# Patient Record
Sex: FEMALE | Race: WHITE | Employment: FULL TIME | URBAN - METROPOLITAN AREA
[De-identification: names, ages, dates, MRNs, and addresses within clinical notes are randomized per-mention and may not be internally consistent; named-entity substitution may affect disease eponyms.]

---

## 2017-06-10 ASSESSMENT — ENCOUNTER SYMPTOMS
FATIGUE: 1
CHILLS: 0
WEIGHT LOSS: 0
TREMORS: 0
PARALYSIS: 0
DIZZINESS: 0
SEIZURES: 0
DISTURBANCES IN COORDINATION: 0
HALLUCINATIONS: 0
DEPRESSION: 0
INCREASED ENERGY: 0
NERVOUS/ANXIOUS: 1
TINGLING: 0
HEADACHES: 1
DECREASED CONCENTRATION: 1
PANIC: 0
DECREASED APPETITE: 0
POLYDIPSIA: 0
POLYPHAGIA: 0
INSOMNIA: 1
ALTERED TEMPERATURE REGULATION: 1
WEAKNESS: 0
NIGHT SWEATS: 1
SPEECH CHANGE: 0
MEMORY LOSS: 1
WEIGHT GAIN: 1
LOSS OF CONSCIOUSNESS: 0
NUMBNESS: 0
FEVER: 0

## 2017-06-23 ENCOUNTER — OFFICE VISIT (OUTPATIENT)
Dept: ENDOCRINOLOGY | Facility: CLINIC | Age: 32
End: 2017-06-23

## 2017-06-23 VITALS
DIASTOLIC BLOOD PRESSURE: 77 MMHG | BODY MASS INDEX: 27.38 KG/M2 | HEART RATE: 66 BPM | SYSTOLIC BLOOD PRESSURE: 123 MMHG | WEIGHT: 180.1 LBS

## 2017-06-23 DIAGNOSIS — E06.3 HASHIMOTO'S THYROIDITIS: Primary | ICD-10-CM

## 2017-06-23 DIAGNOSIS — E06.3 HASHIMOTO'S THYROIDITIS: ICD-10-CM

## 2017-06-23 LAB
DEPRECATED CALCIDIOL+CALCIFEROL SERPL-MC: 25 UG/L (ref 20–75)
T4 FREE SERPL-MCNC: 1.16 NG/DL (ref 0.76–1.46)
TSH SERPL DL<=0.05 MIU/L-ACNC: 3.03 MU/L (ref 0.4–4)

## 2017-06-23 NOTE — PROGRESS NOTES
Endocrinology Note         Omayra is a 32 year old female presents today for Hashimoto's thyroiditis    HPI  Omayra Johnson is a 32 years old female who is here follow up Hashimoto's thyrooditis. Last seen by me 7/2016     She was noted to have abnormal thyroid function, TSH 5.04, FT4 0.97 on 8/3020/13 during her annual gynecologic examination. She has started on thyroid medication given the plan for pregnancy at that time.    Interim history  She is currently on levothyroxine 50 mcg daily. She continues to feel tired and fatigue but otherwise feeling well. Weight is stable.  She is eating well but has not exercise regularly. She has regularly period but slightly heavier over the past past few months. She gets quite anxious easily. She feels slightly cold but no heat intolerance, no diarrhea or constipation.     Past Medical History  Hashimoto's hypothyroidism  Depression/Anxiety    Allergies  Allergies   Allergen Reactions     No Known Drug Allergies      Medications  Current Outpatient Prescriptions   Medication Sig Dispense Refill     adapalene (DIFFERIN) 0.1 % gel        levothyroxine (SYNTHROID, LEVOTHROID) 50 MCG tablet Take 1 tablet (50 mcg) by mouth daily 90 tablet 4     Family History  Mother is healthy  Colon CA in her maternal grandfather and maternal grandmother  Stroke in her maternal grandmother, paternal grandfather, and paternal grandmother.    Social History  No smoking  No EtOH  No illicit drug  Work in human resource department  , no children    ROS  Constitutional:+fatigue, weight stable  Eyes: no vision change, diplopia or red eyes   Cardiovascular: no chest pain, palpitation  Respiratory: no dyspnea, cough, shortness of breath  GI: no nausea, vomiting, diarrhea or constipation, no abdominal pain   Musculoskeletal: no legs swelling  Neuro:no numbness or tingling, no tremor, no dizziness, no headache   Endo: no temperature intolerance, no hot or cold intolerance,  Psych:  +anxiety, sleeps ok but waking up often    Physical Exam  /77  Pulse 66  Wt 81.7 kg (180 lb 1.6 oz)  BMI 27.38 kg/m2  Body mass index is 27.38 kg/(m^2).  Constitutional: no distress, comfortable, pleasant   Eyes: anicteric, normal extra-ocular movements, no lid lag or retraction  Neck: fullness in her thyroid, no thyromegaly  Cardiovascular: regular rate and rhythm, normal S1 and S2, no murmurs  Respiratory: clear to auscultation, no wheezes or crackles, normal breath sounds   Gastrointestinal: nontender, no hepatomegaly  Musculoskeletal: no edema   Skin: no jaundice   Neurological:  2+reflexes at patella, normal gait, no tremor     RESULTS  Lab from [facility]  7/8/10: TSH 4.55  4/10/12: TSH 6.69  5/2/12: TSH 5.26, FT4 1.24, TT3 3.3  11/16/12: TSH 2.38    Lab from [facility] (OB-GYN clinic)  4/10/12: TSH 6.69  8/30/13: TSH 5.04, FT4 0.97        ASSESSMENT:    Omayra Ayers is a 32 years old female who is here for follow Hashimoto's hypothyroidism     1) Hashimoto's hypothyroidism: presented with subclinical hypothyroidism. She was started on thyroid replacement given the plan for pregnancy. She is currently on levothyroxine 50 mcg. I will repeat lab again today and adjust dose accordingly.    2) Hx of depression/anxiety: was taking wellbutrin and celexa but currently not taking.     3) family planning: regular period. No pregnancy desire right now.    PLAN:   - check lab today and adjust dose accordingly    Results for OMAYRA AYERS (MRN [id number]) as of 6/30/2017 17:57   Ref. Range 6/23/2017 15:23   T4 Free Latest Ref Range: 0.76 - 1.46 ng/dL 1.16   TSH Latest Ref Range: 0.40 - 4.00 mU/L 3.03   Vitamin D Deficiency screening Latest Ref Range: 20 - 75 ug/L 25     Will slightly increase levothyroxine to 50 mcg on weekday (5 days per week) and 2 pills (100 mcg) on weekend (2 days per week). I will repeat lab in 2 months. Patient verbalized understanding.  Also recommend the patient to  take vitamin D 1000 IU particularly at winter.    Aurelio Santamaria MD  Endocrinology  [phone]

## 2017-06-23 NOTE — LETTER
6/23/2017       RE: Omayra Johnson  [address] [place] [address]     Dear Colleague,    Thank you for referring your patient, Omayra Johnson, to the [facility] ENDOCRINOLOGY at [facility]. Please see a copy of my visit note below.         Endocrinology Note         Omayra is a 32 year old female presents today for Hashimoto's thyroiditis    HPI  Omayra Johnson is a 32 years old female who is here follow up Hashimoto's thyrooditis. Last seen by me 7/2016     She was noted to have abnormal thyroid function, TSH 5.04, FT4 0.97 on 8/3020/13 during her annual gynecologic examination. She has started on thyroid medication given the plan for pregnancy at that time.    Interim history  She is currently on levothyroxine 50 mcg daily. She continues to feel tired and fatigue but otherwise feeling well. Weight is stable.  She is eating well but has not exercise regularly. She has regularly period but slightly heavier over the past past few months. She gets quite anxious easily. She feels slightly cold but no heat intolerance, no diarrhea or constipation.     Past Medical History  Hashimoto's hypothyroidism  Depression/Anxiety    Allergies  Allergies   Allergen Reactions     No Known Drug Allergies      Medications  Current Outpatient Prescriptions   Medication Sig Dispense Refill     adapalene (DIFFERIN) 0.1 % gel        levothyroxine (SYNTHROID, LEVOTHROID) 50 MCG tablet Take 1 tablet (50 mcg) by mouth daily 90 tablet 4     Family History  Mother is healthy  Colon CA in her maternal grandfather and maternal grandmother  Stroke in her maternal grandmother, paternal grandfather, and paternal grandmother.    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  Constitutional: no distress, comfortable, pleasant   Eyes: anicteric, normal extra-ocular movements, no lid lag or retraction  Neck: fullness in her thyroid, no thyromegaly  Cardiovascular: regular rate and rhythm, normal S1 and S2, no murmurs  Respiratory: clear to auscultation, no wheezes or crackles, normal breath sounds   Gastrointestinal: nontender, no hepatomegaly  Musculoskeletal: no edema   Skin: no jaundice   Neurological:  2+reflexes at patella, normal gait, no tremor     RESULTS  Lab from [facility]  7/8/10: TSH 4.55  4/10/12: TSH 6.69  5/2/12: TSH 5.26, FT4 1.24, TT3 3.3  11/16/12: TSH 2.38    Lab from [facility] (OB-GYN clinic)  4/10/12: TSH 6.69  8/30/13: TSH 5.04, FT4 0.97        ASSESSMENT:    Omayra Ayers is a 32 years old female who is here for follow Hashimoto's hypothyroidism     1) Hashimoto's hypothyroidism: presented with subclinical hypothyroidism. She was started on thyroid replacement given the plan for pregnancy. She is currently on levothyroxine 50 mcg. I will repeat lab again today and adjust dose accordingly.    2) Hx of depression/anxiety: was taking wellbutrin and celexa but currently not taking.     3) family planning: regular period. No pregnancy desire right now.    PLAN:   - check lab today and adjust dose accordingly    Results for OMAYRA AYERS (MRN [id number]) as of 6/30/2017 17:57   Ref. Range 6/23/2017 15:23   T4 Free Latest Ref Range: 0.76 - 1.46 ng/dL 1.16   TSH Latest Ref Range: 0.40 - 4.00 mU/L 3.03   Vitamin D  Deficiency screening Latest Ref Range: 20 - 75 ug/L 25     Will slightly increase levothyroxine to 50 mcg on weekday (5 days per week) and 2 pills (100 mcg) on weekend (2 days per week). I will repeat lab in 2 months. Patient verbalized understanding.  Also recommend the patient to take vitamin D 1000 IU particularly at winter.    Aurelio Santamaria MD  Endocrinology  [phone]

## 2017-06-23 NOTE — MR AVS SNAPSHOT
After Visit Summary   6/23/2017    Omayra Johnson    MRN: [id number]           Patient Information     Date Of Birth          1985        Visit Information        Provider Department      6/23/2017 2:20 PM Aurelio Santamaria MD [facility] Endocrinology        Today's Diagnoses     Hashimoto's thyroiditis    -  1       Follow-ups after your visit        Your next 10 appointments already scheduled     Jun 23, 2017  3:00 PM CDT   LAB with  LAB   [facility] ([facility])    [address] [place] [address] [phone]           Patient must bring picture ID.  Patient should be prepared to give a urine specimen  Please do not eat 10-12 hours before your appointment if you are coming in fasting for labs on lipids, cholesterol, or glucose (sugar).  Pregnant women should follow their Care Team instructions. Water with medications is okay. Do not drink coffee or other fluids.   If you have concerns about taking  your medications, please ask at office or if scheduling via Deem, send a message by clicking on Secure Messaging, Message Your Care Team.              Future tests that were ordered for you today     Open Future Orders        Priority Expected Expires Ordered    TSH Routine 6/23/2017 6/23/2018 6/23/2017    T4 free Routine 6/23/2017 6/23/2018 6/23/2017    Vitamin D Deficiency (D3 Only) Routine 6/23/2017 6/23/2018 6/23/2017            Who to contact     Please call your clinic at [phone] to:    Ask questions about your health    Make or cancel appointments    Discuss your medicines    Learn about your test results    Speak to your doctor   If you have compliments or concerns about an experience at your clinic, or if you wish to file a complaint, please contact [facility] Patient Relations at [phone] or email us at [email or web address]         Additional Information About Your Visit         Goby LLChart Information     [email or web address] gives you secure access to your electronic health record. If you see a primary care provider, you can also send messages to your care team and make appointments. If you have questions, please call your primary care clinic.  If you do not have a primary care provider, please call [phone] and they will assist you.      [email or web address] is an electronic gateway that provides easy, online access to your medical records. With [email or web address], you can request a clinic appointment, read your test results, renew a prescription or communicate with your care team.     To access your existing account, please contact your [facility] or call [phone] for assistance.        Care EveryWhere ID     This is your Care EveryWhere ID. This could be used by other organizations to access your [place] medical records  [id number]        Your Vitals Were     Pulse BMI (Body Mass Index)                66 27.38 kg/m2           Blood Pressure from Last 3 Encounters:   06/23/17 123/77   07/29/16 135/84   04/03/15 131/80    Weight from Last 3 Encounters:   06/23/17 81.7 kg (180 lb 1.6 oz)   07/29/16 77.6 kg (171 lb)   04/03/15 83.7 kg (184 lb 8 oz)               Primary Care Provider Office Phone # Fax #    Ugo Dhillon [phone]       [facility] [place] [address]  [place] [address]        Equal Access to Services     BOBBY MOROCHO AH: Hadii aad ku hadasho Soomaali, waaxda luqadaha, qaybta kaalmada adeegyada, jesus woodson la'jaspern ah. So [facility] [phone].    ATENCIÓN: Si habla español, tiene a brown disposición servicios gratuitos de asistencia lingüística. Llame al [phone].    We comply with applicable federal civil rights laws and [place] laws. We do not discriminate on the basis of race, color, national origin, age, disability sex, sexual orientation or gender identity.            Thank you!     Thank you for choosing  [facility] ENDOCRINOLOGY  for your care. Our goal is always to provide you with excellent care. Hearing back from our patients is one way we can continue to improve our services. Please take a few minutes to complete the written survey that you may receive in the mail after your visit with us. Thank you!             Your Updated Medication List - Protect others around you: Learn how to safely use, store and throw away your medicines at www.disposemymeds.org.          This list is accurate as of: 6/23/17  2:57 PM.  Always use your most recent med list.                   Brand Name Dispense Instructions for use Diagnosis    adapalene 0.1 % gel    DIFFERIN      Hashimoto's thyroiditis       levothyroxine 50 MCG tablet    SYNTHROID/LEVOTHROID    90 tablet    Take 1 tablet (50 mcg) by mouth daily    Hashimoto's thyroiditis

## 2017-08-17 ENCOUNTER — DOCUMENTATION ONLY (OUTPATIENT)
Dept: LAB | Facility: CLINIC | Age: 32
End: 2017-08-17

## 2017-08-17 DIAGNOSIS — E06.3 HASHIMOTO'S THYROIDITIS: Primary | ICD-10-CM

## 2017-08-17 NOTE — PROGRESS NOTES
Patient is scheduled for lab only on 8/24/17 for a lab recheck at [facility]. Please review and place future orders to be completed at that time.    Thank you.

## 2017-08-24 DIAGNOSIS — E06.3 HASHIMOTO'S THYROIDITIS: ICD-10-CM

## 2017-08-24 PROCEDURE — 84439 ASSAY OF FREE THYROXINE: CPT | Performed by: INTERNAL MEDICINE

## 2017-08-24 PROCEDURE — 84443 ASSAY THYROID STIM HORMONE: CPT | Performed by: INTERNAL MEDICINE

## 2017-08-24 PROCEDURE — 36415 COLL VENOUS BLD VENIPUNCTURE: CPT | Performed by: INTERNAL MEDICINE

## 2017-08-25 DIAGNOSIS — E06.3 HASHIMOTO'S THYROIDITIS: ICD-10-CM

## 2017-08-25 LAB
T4 FREE SERPL-MCNC: 1.41 NG/DL (ref 0.76–1.46)
TSH SERPL DL<=0.005 MIU/L-ACNC: 0.87 MU/L (ref 0.4–4)

## 2017-08-25 RX ORDER — LEVOTHYROXINE SODIUM 50 UG/1
TABLET ORAL
Qty: 108 TABLET | Refills: 4 | Status: SHIPPED | OUTPATIENT
Start: 2017-08-25 | End: 2018-10-10

## 2017-10-22 ENCOUNTER — HEALTH MAINTENANCE LETTER (OUTPATIENT)
Age: 32
End: 2017-10-22

## 2018-05-23 ENCOUNTER — HOSPITAL ENCOUNTER (OUTPATIENT)
Facility: CLINIC | Age: 33
Setting detail: OBSERVATION
Discharge: HOME | End: 2018-05-24
Attending: EMERGENCY MEDICINE | Admitting: INTERNAL MEDICINE
Payer: COMMERCIAL

## 2018-05-23 DIAGNOSIS — Z98.890 S/P COLONOSCOPY WITH POLYPECTOMY: ICD-10-CM

## 2018-05-23 DIAGNOSIS — K92.2 LOWER GI BLEED: ICD-10-CM

## 2018-05-23 LAB
ABO + RH BLD: NORMAL
ABO + RH BLD: NORMAL
ANION GAP SERPL CALCULATED.3IONS-SCNC: 5 MMOL/L (ref 3–14)
BLD GP AB SCN SERPL QL: NORMAL
BLOOD BANK CMNT PATIENT-IMP: NORMAL
BUN SERPL-MCNC: 15 MG/DL (ref 7–30)
CALCIUM SERPL-MCNC: 8.8 MG/DL (ref 8.5–10.1)
CHLORIDE SERPL-SCNC: 106 MMOL/L (ref 94–109)
CO2 SERPL-SCNC: 29 MMOL/L (ref 20–32)
CREAT SERPL-MCNC: 0.75 MG/DL (ref 0.52–1.04)
ERYTHROCYTE [DISTWIDTH] IN BLOOD BY AUTOMATED COUNT: 13.1 % (ref 10–15)
GFR SERPL CREATININE-BSD FRML MDRD: 88 ML/MIN/1.7M2
GLUCOSE SERPL-MCNC: 130 MG/DL (ref 70–99)
HCT VFR BLD AUTO: 35.7 % (ref 35–47)
HGB BLD-MCNC: 11.4 G/DL (ref 11.7–15.7)
HGB BLD-MCNC: 11.9 G/DL (ref 11.7–15.7)
MCH RBC QN AUTO: 29.9 PG (ref 26.5–33)
MCHC RBC AUTO-ENTMCNC: 33.3 G/DL (ref 31.5–36.5)
MCV RBC AUTO: 90 FL (ref 78–100)
PLATELET # BLD AUTO: 261 10E9/L (ref 150–450)
POTASSIUM SERPL-SCNC: 3.4 MMOL/L (ref 3.4–5.3)
RBC # BLD AUTO: 3.98 10E12/L (ref 3.8–5.2)
SODIUM SERPL-SCNC: 140 MMOL/L (ref 133–144)
SPECIMEN EXP DATE BLD: NORMAL
WBC # BLD AUTO: 10.3 10E9/L (ref 4–11)

## 2018-05-23 PROCEDURE — 86901 BLOOD TYPING SEROLOGIC RH(D): CPT | Performed by: EMERGENCY MEDICINE

## 2018-05-23 PROCEDURE — 99285 EMERGENCY DEPT VISIT HI MDM: CPT | Mod: 25

## 2018-05-23 PROCEDURE — 99220 ZZC INITIAL OBSERVATION CARE,LEVL III: CPT | Performed by: INTERNAL MEDICINE

## 2018-05-23 PROCEDURE — 86850 RBC ANTIBODY SCREEN: CPT | Performed by: EMERGENCY MEDICINE

## 2018-05-23 PROCEDURE — 86900 BLOOD TYPING SEROLOGIC ABO: CPT | Performed by: EMERGENCY MEDICINE

## 2018-05-23 PROCEDURE — 85018 HEMOGLOBIN: CPT | Performed by: EMERGENCY MEDICINE

## 2018-05-23 PROCEDURE — 25000128 H RX IP 250 OP 636: Performed by: INTERNAL MEDICINE

## 2018-05-23 PROCEDURE — 85027 COMPLETE CBC AUTOMATED: CPT | Performed by: EMERGENCY MEDICINE

## 2018-05-23 PROCEDURE — G0378 HOSPITAL OBSERVATION PER HR: HCPCS

## 2018-05-23 PROCEDURE — 80048 BASIC METABOLIC PNL TOTAL CA: CPT | Performed by: EMERGENCY MEDICINE

## 2018-05-23 PROCEDURE — 36415 COLL VENOUS BLD VENIPUNCTURE: CPT

## 2018-05-23 RX ORDER — ONDANSETRON 2 MG/ML
4 INJECTION INTRAMUSCULAR; INTRAVENOUS EVERY 6 HOURS PRN
Status: DISCONTINUED | OUTPATIENT
Start: 2018-05-23 | End: 2018-05-24 | Stop reason: HOSPADM

## 2018-05-23 RX ORDER — ACETAMINOPHEN 325 MG/1
650 TABLET ORAL EVERY 4 HOURS PRN
Status: DISCONTINUED | OUTPATIENT
Start: 2018-05-23 | End: 2018-05-24 | Stop reason: HOSPADM

## 2018-05-23 RX ORDER — LIDOCAINE 40 MG/G
CREAM TOPICAL
Status: DISCONTINUED | OUTPATIENT
Start: 2018-05-23 | End: 2018-05-24 | Stop reason: HOSPADM

## 2018-05-23 RX ORDER — LEVOTHYROXINE SODIUM 100 UG/1
100 TABLET ORAL
Status: DISCONTINUED | OUTPATIENT
Start: 2018-05-26 | End: 2018-05-24 | Stop reason: HOSPADM

## 2018-05-23 RX ORDER — ACETAMINOPHEN 650 MG/1
650 SUPPOSITORY RECTAL EVERY 4 HOURS PRN
Status: DISCONTINUED | OUTPATIENT
Start: 2018-05-23 | End: 2018-05-24 | Stop reason: HOSPADM

## 2018-05-23 RX ORDER — POLYETHYLENE GLYCOL 3350 17 G/17G
17 POWDER, FOR SOLUTION ORAL DAILY PRN
Status: DISCONTINUED | OUTPATIENT
Start: 2018-05-23 | End: 2018-05-24 | Stop reason: HOSPADM

## 2018-05-23 RX ORDER — NALOXONE HYDROCHLORIDE 0.4 MG/ML
.1-.4 INJECTION, SOLUTION INTRAMUSCULAR; INTRAVENOUS; SUBCUTANEOUS
Status: DISCONTINUED | OUTPATIENT
Start: 2018-05-23 | End: 2018-05-24 | Stop reason: HOSPADM

## 2018-05-23 RX ORDER — ONDANSETRON 4 MG/1
4 TABLET, ORALLY DISINTEGRATING ORAL EVERY 6 HOURS PRN
Status: DISCONTINUED | OUTPATIENT
Start: 2018-05-23 | End: 2018-05-24 | Stop reason: HOSPADM

## 2018-05-23 RX ORDER — LEVOTHYROXINE SODIUM 50 UG/1
50 TABLET ORAL
Status: DISCONTINUED | OUTPATIENT
Start: 2018-05-24 | End: 2018-05-24 | Stop reason: HOSPADM

## 2018-05-23 RX ORDER — SODIUM CHLORIDE 9 MG/ML
INJECTION, SOLUTION INTRAVENOUS CONTINUOUS
Status: DISCONTINUED | OUTPATIENT
Start: 2018-05-23 | End: 2018-05-24

## 2018-05-23 RX ADMIN — SODIUM CHLORIDE 1000 ML: 9 INJECTION, SOLUTION INTRAVENOUS at 23:30

## 2018-05-23 ASSESSMENT — ENCOUNTER SYMPTOMS
BLOOD IN STOOL: 1
LIGHT-HEADEDNESS: 0
RECTAL PAIN: 0
ANAL BLEEDING: 1
ABDOMINAL PAIN: 0

## 2018-05-23 NOTE — IP AVS SNAPSHOT
MRN:[id number]                      After Visit Summary   5/23/2018    Omayra Johnson    MRN: [id number]           Thank you!     Thank you for choosing [facility] for your care. Our goal is always to provide you with excellent care. Hearing back from our patients is one way we can continue to improve our services. Please take a few minutes to complete the written survey that you may receive in the mail after you visit with us. Thank you!        Patient Information     Date Of Birth          1985        Designated Caregiver       Most Recent Value    Caregiver    Will someone help with your care after discharge? yes    Name of designated caregiver Franklin VELEZ      About your hospital stay     You were admitted on:  May 23, 2018 You last received care in the:  [place] [address] [facility]    You were discharged on:  May 24, 2018        Reason for your hospital stay       You were admitted with post polypectomy bleeding that has resolved.                  Who to Call     For medical emergencies, please call 911.  For non-urgent questions about your medical care, please call your primary care provider or clinic, [phone]  For questions related to your surgery, please call your surgery clinic        Attending Provider     Provider Specialty    Trierweiler, Chad A, MD Emergency Medicine    Ramsey Avendano MD Internal Medicine       Primary Care Provider Office Phone # Fax #    Omayra Harris [phone]       When to contact your care team       Call your primary doctor if you have any of the following: increased pain or bleeding or dizziness.                  After Care Instructions     Activity       Your activity upon discharge: activity as tolerated            Diet       Follow this diet upon discharge: Orders Placed This Encounter      Regular Diet Adult            Discharge Instructions       Recommend over-the-counter iron supplements/diet rich in  "iron.  Follow-up with [facility] as per schedule                  Follow-up Appointments     Follow-up and recommended labs and tests        Follow up with primary care provider, Omayra Hraris, within 7-14 days to evaluate after surgery and for hospital follow- up.  The following labs/tests are recommended: Hemoglobin level .                  Pending Results     No orders found for last 3 day(s).            Statement of Approval     Ordered          05/24/18 1520  I have reviewed and agree with all the recommendations and orders detailed in this document.  EFFECTIVE NOW     Approved and electronically signed by:  Sreekanth Cloon MD             Admission Information     Date & Time Provider Department Dept. Phone    5/23/2018 Ramsey Avendano MD [place] [address] [facility] [phone]      Your Vitals Were     Blood Pressure Pulse Temperature Respirations Height Weight    110/60 (BP Location: Left arm) 81 98.6  F (37  C) (Oral) 16 1.727 m (5' 8\") 81.7 kg (180 lb 3.2 oz)    Pulse Oximetry BMI (Body Mass Index)                96% 27.4 kg/m2          Traity Information     Traity gives you secure access to your electronic health record. If you see a primary care provider, you can also send messages to your care team and make appointments. If you have questions, please call your primary care clinic.  If you do not have a primary care provider, please call [phone] and they will assist you.        Care EveryWhere ID     This is your Care EveryWhere ID. This could be used by other organizations to access your [place] medical records  [id number]        Equal Access to Services     BOBBY MOROCHO : Hadii tyler garciao Somickeyali, waaxda luqadaha, qaybta kaalmada batoolyacarmina, jesus gurrola . So [facility] [phone].    ATENCIÓN: Si habla español, tiene a brown disposición servicios gratuitos de asistencia lingüística. Llame al [phone].    We comply with " applicable federal civil rights laws and [place] laws. We do not discriminate on the basis of race, color, national origin, age, disability, sex, sexual orientation, or gender identity.               Review of your medicines      CONTINUE these medicines which have NOT CHANGED        Dose / Directions    adapalene 0.1 % gel   Commonly known as:  DIFFERIN   Used for:  Hashimoto's thyroiditis        Apply topically daily   Refills:  0       levothyroxine 50 MCG tablet   Commonly known as:  SYNTHROID/LEVOTHROID   Used for:  Hashimoto's thyroiditis        50 mcg daily on weekday (5 days per week) and 2 pills (100 mcg) on weekend (2 days per week)   Quantity:  108 tablet   Refills:  4       SERTRALINE HCL PO        Dose:  50 mg   Take 50 mg by mouth every evening   Refills:  0                Protect others around you: Learn how to safely use, store and throw away your medicines at www.disposemymeds.org.             Medication List: This is a list of all your medications and when to take them. Check marks below indicate your daily home schedule. Keep this list as a reference.      Medications           Morning Afternoon Evening Bedtime As Needed    adapalene 0.1 % gel   Commonly known as:  DIFFERIN   Apply topically daily                                levothyroxine 50 MCG tablet   Commonly known as:  SYNTHROID/LEVOTHROID   50 mcg daily on weekday (5 days per week) and 2 pills (100 mcg) on weekend (2 days per week)   Last time this was given:  50 mcg on 5/24/2018  7:51 AM                                SERTRALINE HCL PO   Take 50 mg by mouth every evening

## 2018-05-23 NOTE — IP AVS SNAPSHOT
[address], [address]    [facility] [address]    Phone:  [phone]                                       After Visit Summary   5/23/2018    Omayra Johnson    MRN: [id number]           After Visit Summary Signature Page     I have received my discharge instructions, and my questions have been answered. I have discussed any challenges I see with this plan with the nurse or doctor.    ..........................................................................................................................................  Patient/Patient Representative Signature      ..........................................................................................................................................  Patient Representative Print Name and Relationship to Patient    ..................................................               ................................................  Date                                            Time    ..........................................................................................................................................  Reviewed by Signature/Title    ...................................................              ..............................................  Date                                                            Time

## 2018-05-24 ENCOUNTER — SURGERY (OUTPATIENT)
Age: 33
End: 2018-05-24

## 2018-05-24 VITALS
SYSTOLIC BLOOD PRESSURE: 110 MMHG | WEIGHT: 180.2 LBS | RESPIRATION RATE: 16 BRPM | OXYGEN SATURATION: 96 % | TEMPERATURE: 98.6 F | HEART RATE: 81 BPM | BODY MASS INDEX: 27.31 KG/M2 | DIASTOLIC BLOOD PRESSURE: 60 MMHG | HEIGHT: 68 IN

## 2018-05-24 LAB
FLEXIBLE SIGMOIDOSCOPY: NORMAL
HGB BLD-MCNC: 11.2 G/DL (ref 11.7–15.7)
HGB BLD-MCNC: 11.4 G/DL (ref 11.7–15.7)
HGB BLD-MCNC: 11.5 G/DL (ref 11.7–15.7)

## 2018-05-24 PROCEDURE — 45330 DIAGNOSTIC SIGMOIDOSCOPY: CPT | Performed by: INTERNAL MEDICINE

## 2018-05-24 PROCEDURE — G0500 MOD SEDAT ENDO SERVICE >5YRS: HCPCS | Performed by: INTERNAL MEDICINE

## 2018-05-24 PROCEDURE — 85018 HEMOGLOBIN: CPT | Performed by: INTERNAL MEDICINE

## 2018-05-24 PROCEDURE — 36415 COLL VENOUS BLD VENIPUNCTURE: CPT | Performed by: INTERNAL MEDICINE

## 2018-05-24 PROCEDURE — 25000132 ZZH RX MED GY IP 250 OP 250 PS 637: Performed by: INTERNAL MEDICINE

## 2018-05-24 PROCEDURE — 99217 ZZC OBSERVATION CARE DISCHARGE: CPT | Performed by: HOSPITALIST

## 2018-05-24 PROCEDURE — 99207 ZZC CDG-CODE CATEGORY CHANGED: CPT | Performed by: HOSPITALIST

## 2018-05-24 PROCEDURE — 25000128 H RX IP 250 OP 636: Performed by: INTERNAL MEDICINE

## 2018-05-24 PROCEDURE — G0378 HOSPITAL OBSERVATION PER HR: HCPCS

## 2018-05-24 RX ORDER — FENTANYL CITRATE 50 UG/ML
INJECTION, SOLUTION INTRAMUSCULAR; INTRAVENOUS PRN
Status: DISCONTINUED | OUTPATIENT
Start: 2018-05-24 | End: 2018-05-24 | Stop reason: HOSPADM

## 2018-05-24 RX ORDER — LIDOCAINE 40 MG/G
CREAM TOPICAL
Status: DISCONTINUED | OUTPATIENT
Start: 2018-05-24 | End: 2018-05-24 | Stop reason: HOSPADM

## 2018-05-24 RX ADMIN — MIDAZOLAM 1 MG: 1 INJECTION INTRAMUSCULAR; INTRAVENOUS at 12:51

## 2018-05-24 RX ADMIN — LEVOTHYROXINE SODIUM 50 MCG: 50 TABLET ORAL at 07:51

## 2018-05-24 RX ADMIN — SODIUM CHLORIDE: 9 INJECTION, SOLUTION INTRAVENOUS at 01:33

## 2018-05-24 RX ADMIN — FENTANYL CITRATE 100 MCG: 50 INJECTION, SOLUTION INTRAMUSCULAR; INTRAVENOUS at 12:51

## 2018-05-24 RX ADMIN — MIDAZOLAM 1 MG: 1 INJECTION INTRAMUSCULAR; INTRAVENOUS at 12:52

## 2018-05-24 NOTE — PLAN OF CARE
Problem: Patient Care Overview  Goal: Plan of Care/Patient Progress Review  Outcome: No Change  Pt arrived to floor at 2300. AOx4. VSS on RA. Abdominal discomfort/cramping, declines intervention. No c/o N/V. Clears diet. Dark/maroon loose stool x1 overnight. R PIV infusing NS@100, 1L bolus overnight. Up independently. GI consulted.  at bedside. Pt appeared to rest comfortably overnight. Plan pending. Continue to monitor.

## 2018-05-24 NOTE — PHARMACY-ADMISSION MEDICATION HISTORY
Admission medication history interview status for the 5/23/2018  admission is complete. See EPIC admission navigator for prior to admission medications     Medication history source reliability:Good    Actions taken by pharmacist (provider contacted, etc):None     Additional medication history information not noted on PTA med list :None    Medication reconciliation/reorder completed by provider prior to medication history? No    Time spent in this activity: 10 min    Prior to Admission medications    Medication Sig Last Dose Taking? Auth Provider   adapalene (DIFFERIN) 0.1 % gel Apply topically daily  5/23/2018 at Unknown time Yes Reported, Patient   levothyroxine (SYNTHROID/LEVOTHROID) 50 MCG tablet 50 mcg daily on weekday (5 days per week) and 2 pills (100 mcg) on weekend (2 days per week) 5/23/2018 at am Yes Aurelio Santamaria MD   SERTRALINE HCL PO Take 50 mg by mouth every evening 5/23/2018 at 1930 Yes Unknown, Entered By History

## 2018-05-24 NOTE — ED PROVIDER NOTES
"  History     Chief Complaint:  Rectal bleeding    HPI   Omayra Johnson is a 32 year old female who presents with rectal bleeding. The patient had a colonoscopy performed on Monday (2 days ago) in which 4 polyps were found and removed. She did not have a bowel movement yesterday, but tonight at 8:30 (one hour ago) felt like she needed to go. When patient got up to use the restroom, initially thought she was going to pass gas but instead passed a large amount of blood. She describes that it started out as a dark maroon but then turned into a bright red. This episode then prompted her to immediately come to the ED. Upon arrival, she reports that she needs to go again, but denies pain or any other symptoms. She mentioned she felt lightheaded initially after the incident, but is no longer experiencing this.    Allergies:  No Known Drug Allergies     Medications:    Synthroid    Past Medical History:    Acne  Depression  Insomnia    Past Surgical History:    The patient does not have any pertinent past surgical history.    Family History:    Cerebrovascular disease    Social History:  The patient was accompanied to the ED by her .  Smoking Status: No  Smokeless Tobacco: No  Alcohol Use: Yes  Marital Status:       Review of Systems   Gastrointestinal: Positive for anal bleeding and blood in stool. Negative for abdominal pain and rectal pain.   Neurological: Negative for light-headedness.   All other systems reviewed and are negative.    Physical Exam   Vitals:  Patient Vitals for the past 24 hrs:   BP Temp Temp src Pulse Resp SpO2 Height Weight   05/23/18 2240 (!) 115/91 - - 64 - 100 % - -   05/23/18 2125 114/61 98.4  F (36.9  C) Oral 80 16 97 % 1.727 m (5' 8\") 81.6 kg (180 lb)     Physical Exam  Eye:  Pupils are equal, round, and reactive.  Extraocular movements intact.    ENT:  No rhinorrhea.  Moist mucus membranes.  Normal tongue and tonsil.    Cardiac:  Regular rate and rhythm.  No murmurs, gallops, " or rubs.    Pulmonary:  Clear to auscultation bilaterally.  No wheezes, rales, or rhonchi.    Abdomen:  Positive bowel sounds.  Abdomen is soft and non-distended, without focal tenderness.    Musculoskeletal:  Normal movement of all extremities without evidence for deficit.    Skin:  Warm and dry without rashes.    Neurologic:  Non-focal exam without asymmetric weakness or numbness.     Psychiatric:  Normal affect with appropriate interaction with examiner.    Emergency Department Course     Laboratory:  Laboratory findings were communicated with the patient who voiced understanding of the findings.  CBC: AWNL. (WBC 10.3, HGB 11.9, )   BMP: Glucose: 130 (H), o/w WNL (Creatinine 0.75)  Hemoglobin: 11.4 (L)    Emergency Department Course:  Nursing notes and vitals reviewed.  2140 I had my initial encounter with the patient.  I performed an exam of the patient as documented above.   IV was inserted and blood was drawn for laboratory testing, results above.  2154 I spoke with Dr. Duff from [facility] regarding this patient.  2209 I spoke with Dr. Avendano regarding this patient.    2231 I discussed the treatment plan with the patient. They expressed understanding of this plan and consented to admission. I discussed the patient with Dr. Avendano, who will admit the patient to a monitored bed for further evaluation and treatment.    Impression & Plan      Medical Decision Making:  This healthy 32-year-old woman with a strong family history of colon cancer presents to us with rectal bleeding after undergoing a polypectomy ×4 with colonoscopy 2 days ago.  The patient notes she has felt well and did not realize there was a problem until she felt the need to pass gas this evening, but instead passed a large amount of blood.  She describes it initially being dark with clots and then had a second round of excessive discharge of bright red blood.  This all occurred just 1 hour prior to evaluation.  At this time, she has no  abdominal pain with normal vital signs.  A hemoglobin was found to be 11.4, though I do not have access to prior hemoglobins.  I spoke with Dr. Duff of [facility] who recommends admission to observation for serial hemoglobins and for assessment in the morning to determine if she needs a repeat colonoscopy.  With this, I spoke with Dr. Avendano of the hospitalist service will admit the patient under observation status.  Patient is comfortable with the plan for admission and questions were answered.    Diagnosis:    ICD-10-CM    1. Lower GI bleed K92.2 CBC (+ platelets, no diff)     Basic metabolic panel     ABO/Rh type and screen   2. S/P colonoscopy with polypectomy Z98.890        Disposition:   Admission    Scribe Disclosure:  I, Matt Amado, am serving as a scribe at 9:50 PM on 5/23/2018 to document services personally performed by Trierweiler, Chad A, MD, based on my observations and the provider's statements to me.  5/23/2018    EMERGENCY DEPARTMENT       Trierweiler, Chad A, MD  05/23/18 4543

## 2018-05-24 NOTE — CONSULTS
GASTROENTEROLOGY CONSULTATION     Omayra Johnson   [address]  [place] [address]   32 year old female   Admission Date/Time: 5/23/2018   Encounter Date: 5/24/2018  Primary Care Provider: Omayra Harris     Referring / Attending Physician: Salas Avendano   We were asked to see the patient in consultation by Dr. Avendano for evaluation of hematochezia.     HPI: Omayra Johnson is a 32 year old female with a past medical history significant for hypothyroidism, anxiety and depression who presented to the ED for evaluation of rectal bleeding. The patient underwent a screening colonoscopy on Monday May 21 with Dr Dane Love. The patient has a family history of colon polyps and colon cancer. 4 polyps were removed during the colonoscopy with the largest in the proximal rectum. Following the procedure the patient was doing quite well. Last night she felt that she needed to pass gas and passed a large volume of dark red and bright red blood. She began feeling light headed. She was brought to the ED by her .  In the ED she was found to have a hgb of 11.4 which is slightly lower than her usual. She was slightly hypotensive on admission but this improved with IV fluids. She was admitted for observation. Overnight she passed another bloody appearing stool.   This morning she states that she is feeling well. She denies abdominal pain, nausea or vomiting. Hgb this morning was stable at 11.4.     Past Medical History  Past Medical History:   Diagnosis Date     Acne      Depression      Insomnia        Past Surgical History  Past Surgical History:   Procedure Laterality Date     NO HISTORY OF SURGERY         Family History  Family History   Problem Relation Age of Onset     CEREBROVASCULAR DISEASE Maternal Grandmother      Cancer - colorectal Maternal Grandmother      Cancer - colorectal Maternal Grandfather      CEREBROVASCULAR DISEASE Paternal Grandmother      CEREBROVASCULAR DISEASE Paternal  "Grandfather        Social History  Social History     Social History     Marital status:      Spouse name: N/A     Number of children: N/A     Years of education: N/A     Occupational History     Not on file.     Social History Main Topics     Smoking status: Never Smoker     Smokeless tobacco: Never Used     Alcohol use Yes      Comment: 0-2 times per month     Drug use: No     Sexual activity: Yes     Partners: Male     Birth control/ protection: Pill     Other Topics Concern     Not on file     Social History Narrative    , no children    Works at [employer]       Medications  Prior to Admission medications    Medication Sig Start Date End Date Taking? Authorizing Provider   adapalene (DIFFERIN) 0.1 % gel Apply topically daily  1/5/16  Yes Reported, Patient   levothyroxine (SYNTHROID/LEVOTHROID) 50 MCG tablet 50 mcg daily on weekday (5 days per week) and 2 pills (100 mcg) on weekend (2 days per week) 8/25/17  Yes Aurelio Santamaria MD   SERTRALINE HCL PO Take 50 mg by mouth every evening   Yes Unknown, Entered By History       Allergies:  No known drug allergies    ROS: A ten point review of systems was obtained and negative other than the symptoms noted above in the HPI.     Physical Exam:   /73 (BP Location: Left arm)  Pulse 86  Temp 98.2  F (36.8  C) (Oral)  Resp 16  Ht 1.727 m (5' 8\")  Wt 81.7 kg (180 lb 3.2 oz)  SpO2 98%  BMI 27.4 kg/m2   Constitutional: healthy, alert, no acute distress  Cardiovascular: regular rate and rhythm, no murmurs,rubs or gallops  Respiratory: clear to auscultation bilaterally  Psychiatric: normal pleasant affect  Head: atraumatic, normocephalic  Neck: supple, no thyromegaly  ENT: mucous membranes are moist, no oral lesions are noted  Abdomen: soft, non-tender, non-distended, normally active bowel sound. No masses or hepatosplenomegaly is appreciated. No rebound tenderness or guarding  Neuro: Neurologically intact grossly  Skin: warm, dry, " no rashes are noted    ADDITIONAL COMMENTS:   I reviewed the patient's new clinical lab test results.   Recent Labs   Lab Test  05/24/18   0555  05/23/18 2219  05/23/18   2143  05/02/14   1543   WBC   --   10.3   --   7.0   HGB  11.4*  11.9  11.4*  13.5   MCV   --   90   --   91   PLT   --   261   --   279      Recent Labs   Lab Test  05/23/18 2219  07/08/10   1507   NA  140  138   POTASSIUM  3.4  3.9   CHLORIDE  106  107   CO2  29  23   BUN  15  6   CR  0.75  0.69   ANIONGAP  5  8   FER  8.8  9.2      No lab results found.     5/21/18 Colonoscopy ([facility])  Findings:  Polyp location: cecum.  Quantity: 1.  Size: 1 mm.  Polyp shape:  sessile.         Maneuver: polypectomy was performed with a cold biopsy forceps.       Removal:  complete.  Retrieval: complete.  Bleeding: none.  Polyp location: descending/sigmoid.  Quantity: 2.  Size:  5-10 mm.  Polyp shape: pedunculated.         Maneuver: polypectomy was performed with a  hot snare .       Removal: complete.  Retrieval: complete.  Bleeding: none.  Polyp location: rectum-proximal.  Quantity: 1.  Size: 11-15 mm.   Polyp shape: pedunculated.         Maneuver: polypectomy was performed with a hot snare  .       Removal: complete.  Retrieval: complete.  Bleeding: none.    The colonic mucosa and vascularity is otherwise unremarkable.  Anal canal:  normal   A: COLON, CECUM,  POLYP:  1. Tubular adenoma  2. No evidence of high grade dysplasia  3. Per the attached endoscopy report:    a. Polyp size: 1mm    b. Resection: Complete    c. Retrieval: Complete    B: COLON, DESCENDING/SIGMOID, POLYPS:  1. Tubular adenomas (2)  2. No evidence of high grade dysplasia  3. Per the attached endoscopy report:    a. Polyp sizes: 5 mm - 10 mm    b. Resection: Complete    c. Retrieval: Complete       C:  RECTUM, POLYP:   1. Tubular adenoma consistent with advanced adenoma   2. No high grade dysplasia or invasive malignancy   3. Per the attached endoscopy report:      a. Polyp size:  11-15mm    b. Resection: Complete    c. Retrieval: Complete      Assessment:  32 year old female presenting with BRBPR following colonoscopy with polypectomy performed three days ago. The patient had a large polyp removed from the proximal rectum which would likely be the source for bleeding. The two other larger polyps were removed in the sigmoid and descending colon. The patients hgb appears stable currently. Our options include observation for further bleeding vs repeat colonoscopy vs sigmoidoscopy since her largest polyps were distal in the colon. The patient would like to avoid another colonoscopy prep if possible    Plan:   -NPO  -Follow hgb and stool output  -Flexible sigmoidoscopy 1230 with Dr Osborn  -Tap water enema at 1130. Repeat if there is not significant output  -Further recommendations to follow    I discussed the patient's findings and plan with Dr. Sanchez Osborn who will also independently see and examine the patient.     Carlos Dodd PA-C  [facility]  Cell:  [phone] Monday through Friday 1221-3745  Office: [phone]

## 2018-05-24 NOTE — ED NOTES
"[facility]  ED Nurse Handoff Report    ED Chief complaint: Rectal Bleeding (patient had a colonoscopy on Monday, no BM until tonight.  reports a large amount of BRB.)      ED Diagnosis:   Final diagnoses:   Lower GI bleed   S/P colonoscopy with polypectomy       Code Status: Full Code    Allergies:   Allergies   Allergen Reactions     No Known Drug Allergies        Activity level - Baseline/Home:  Independent    Activity Level - Current:   Independent     Needed?: No    Isolation: No  Infection: Not Applicable    Bariatric?: No    Vital Signs:   Vitals:    05/23/18 2125   BP: 114/61   Pulse: 80   Resp: 16   Temp: 98.4  F (36.9  C)   TempSrc: Oral   SpO2: 97%   Weight: 81.6 kg (180 lb)   Height: 1.727 m (5' 8\")       Cardiac Rhythm: ,        Pain level: 0-10 Pain Scale: 0    Is this patient confused?: No   Suicidality: Screened negative    Patient Report: Initial Complaint: Rectal bleeding  Focused Assessment: Patient with recent colonoscopy study presents to ED with rectal bleeding with large bright red blood in stool tonight. Reports stool started with dark maroon color then turned bright red with some clots.   Tests Performed: labs  Abnormal Results:   Results for orders placed or performed during the hospital encounter of 05/23/18   Hemoglobin   Result Value Ref Range    Hemoglobin 11.4 (L) 11.7 - 15.7 g/dL     Treatments provided: No orders at this time.     Family Comments:  at bedside    OBS brochure/video discussed/provided to patient: Yes    ED Medications: Medications - No data to display    Drips infusing?:  No    For the majority of the shift this patient was Green.   Interventions performed were monitor.    Severe Sepsis OR Septic Shock Diagnosis Present: No      ED NURSE PHONE NUMBER: [phone]         "

## 2018-05-24 NOTE — H&P
[facility]    History and Physical  Hospitalist       Date of Admission:  5/23/2018  Date of Service (when I saw the patient): 05/23/18    Assessment & Plan   Omayra Johnson is a 32 year old female with a past medical history of hypothyroidism, anxiety and depression who presents with complaints of rectal bleeding.     Acute blood loss anemia 2/2 GI bleed / Hematochezia   Suspect she is bleeding from once of her surgical polyp removal sites. The patient reports undergoing a screening colonoscopy on Monday with [facility] due to a family history of colon cancer and had 4 polyps removed. Began to have rectal bleeding today.    - Hgb 11.4 on admission. Basleine appears to be normal at >13.    - Presently hemodynamically stable and without tachycardia.    - Will follow Hgb Q6 hours.    - IVF NS at 100 cc/hr and will bolus 1 L NS now.    - [facility] consulted.    - Admit to observation for now but if she continue to bleed then will transfer to inpatient.    - Consent for blood obtained.     Hypothyroidism   - C/w PTA Synthroid.     Anxiety/Depression   - C/w PTA Sertraline.        DVT Prophylaxis: Low Risk/Ambulatory with no VTE prophylaxis indicated  Code Status: Full Code was discussed.     Disposition:  Pending clinical course. Follow Hgb.       Ramsey Avendano       Primary Care Physician   Omayra Harris    Chief Complaint   Rectal bleeding.     History is obtained from the patient    History of Present Illness   Omayra Johnson is a 32 year old female with a past medical history of hypothyroidism, anxiety and depression who presents with complaints of rectal bleeding.  The patient reports undergoing a screening colonoscopy on Monday with [facility] due to a family history of colon cancer and had 4 polyps removed.  Pathology is pending but she did have the printed out report showing removal of one 1 mm polyp in the cecum, two 5-10 mm polyps and one 11-15 mm polyp in the rectum.  She  had been feeling well since Monday with no complaints and had been passing gas but no bowel movements.  Tonight while walking around her house she felt as if she were passing gas but ultimately discovered she was passing bright red blood as it began running down her leg.  She rushed into the bathroom and sat on the toilet passing a large amount of maroon colored/bright red blood per rectum with some associated clots.  She began to clean herself up and began to feel lightheaded.  She again felt the urge to have a bowel movement and again sat on the toilet and passed large amount of blood.  She is unclear if there is any stool present and reports she thinks this may have happened one more time for a total of 3 episodes of blood per rectum.  She was able to get into the shower and clean off realizing that she needed to present to the emergency department.  She does continues to complain of some mild lightheadedness here in the emergency room that she feels a little bit worse than on presentation.  She denies any abdominal pain, nausea, vomiting, diarrhea, fevers, chills and  has never had any bright red blood per rectum prior to today. No h/o ulcers, Asa or NSAID use.   Initial vital signs show blood pressure 114/61, pulse of 80, respiratory rate 16 with room air saturations are 97% and afebrile.  Routine lab work shows an unremarkable BMP, glucose of 130 and a CBC with a hemoglobin of 11.9.  She denies currently menstruating is unsure of her baseline hemoglobin but chart review shows a hemoglobin of greater than 13 a few years back.  Other than some routine lab work in the emergency department no specific treatment was given.  I have ordered a 1 L normal saline bolus and consented her for blood. She will be continued on IV fluids with routine hemoglobin checks and admitted to observation.      Past Medical History    I have reviewed this patient's medical history and updated it with pertinent information if needed.     - Hypothyroidism   - Anxiety    - Depression        Past Surgical History   I have reviewed this patient's surgical history and updated it with pertinent information if needed.  Past Surgical History:   Procedure Laterality Date     NO HISTORY OF SURGERY         Prior to Admission Medications   Prior to Admission Medications   Prescriptions Last Dose Informant Patient Reported? Taking?   SERTRALINE HCL PO 2018 at 1930 Self Yes Yes   Sig: Take 50 mg by mouth every evening   adapalene (DIFFERIN) 0.1 % gel 2018 at Unknown time Self Yes Yes   Sig: Apply topically daily    levothyroxine (SYNTHROID/LEVOTHROID) 50 MCG tablet 2018 at am Self No Yes   Si mcg daily on weekday (5 days per week) and 2 pills (100 mcg) on weekend (2 days per week)      Facility-Administered Medications: None     Allergies   Allergies   Allergen Reactions     No Known Drug Allergies        Social History   I have reviewed this patient's social history and updated it with pertinent information if needed. Omayra Marieharinderjustyna  reports that she has never smoked. She has never used smokeless tobacco. She reports that she drinks alcohol. She reports that she does not use illicit drugs.    Family History   I have reviewed this patient's family history and updated it with pertinent information if needed.   Family History   Problem Relation Age of Onset     CEREBROVASCULAR DISEASE Maternal Grandmother      Cancer - colorectal Maternal Grandmother      Cancer - colorectal Maternal Grandfather      CEREBROVASCULAR DISEASE Paternal Grandmother      CEREBROVASCULAR DISEASE Paternal Grandfather        Review of Systems   The 10 point Review of Systems is negative other than noted in the HPI or here.     Physical Exam   Temp: 98.4  F (36.9  C) Temp src: Oral BP: 114/61 Pulse: 80   Resp: 16 SpO2: 97 % O2 Device: None (Room air)    Vital Signs with Ranges  Temp:  [98.4  F (36.9  C)] 98.4  F (36.9  C)  Pulse:  [80] 80  Resp:  [16] 16  BP:  (114)/(61) 114/61  SpO2:  [97 %] 97 %  180 lbs 0 oz    Constitutional: Young white female. Awake, alert, cooperative, no apparent distress.   Eyes: Conjunctiva and pupils examined and normal.  HEENT: Moist mucous membranes, normal dentition.   Respiratory: Clear to auscultation bilaterally, no crackles or wheezing.  Cardiovascular: Regular rate and rhythm, normal S1 and S2, and no murmur noted.  GI: Soft, non-distended, non-tender, normal bowel sounds.  Lymph/Hematologic: No anterior cervical or supraclavicular adenopathy.  Skin: No rashes, no cyanosis, no edema.  Musculoskeletal: No joint swelling, erythema or tenderness.  Neurologic: Cranial nerves 2-12 intact, normal strength and sensation. No focal deficits.  Psychiatric: Alert, oriented to person, place and time, no obvious anxiety or depression.    Data   Data reviewed today:  I personally reviewed no images or EKG's today.    Recent Labs  Lab 05/23/18  2143   HGB 11.4*       No results found for this or any previous visit (from the past 24 hour(s)).

## 2018-05-24 NOTE — DISCHARGE SUMMARY
Discharge Summary  Hospitalist    Date of Admission:  5/23/2018  Date of Discharge:  5/24/2018  Discharging Provider: Sreekanth Colon MD    Primary Care Physician   Omayra Harris  Primary Care Provider Phone Number: [phone]  Primary Care Provider Fax Number: [phone]    PRINCIPAL DIAGNOSIS  Post polypectomy bleeding resolved  symptomatic acute on chronic anemia from blood loss  Status post screening colonoscopy 5/21    Past Medical History:   Diagnosis Date     Acne      Depression      Insomnia        History of Present Illness   Omayra Johnson is an 32 year old female who presented with blood in the stools    Hospital Course   Omayra Johnson is an 32 year old female with history of hypothyroidism, anxiety, depression presented to the hospital with rectal bleeding. Patient had undergone screening colonoscopy on 5/21/2018 with Fern MURPHY due to family history of colon cancer and had four polyps removed, pathology pending. Has been passing gas post procedure, last night had bright red blood running down her leg. Complained of lightheadedness and admitted on 5/23.    symptomatic acute on chronic anemia from blood loss  Post polypectomy bleeding resolved  Hgb 11.4 on admission. Basleine appears to be normal at >13.   During hospitalization hemodynamically stable and without tachycardia.   Serial hemoglobin's stable greater than 11  underwent flexible sigmoidoscopy by [facility], post polypectomy bleeding that has resolved. Recommend regular diet and plan for discharge  Recommended over-the-counter iron supplements. Recheck hemoglobin in two weeks or earlier if symptomatic     Hypothyroidism  continued with PTA Synthroid.      Anxiety/Depression  continued with PTA sertraline    # Discharge Pain Plan:   - Patient currently has NO PAIN and is not being prescribed pain medications on discharge.  Sreekanth Colon MD    Pending Results   Unresulted Labs Ordered in the Past 30  Days of this Admission     No orders found for last 61 day(s).             Physical Exam   Vitals:    05/23/18 2125 05/23/18 2330   Weight: 81.6 kg (180 lb) 81.7 kg (180 lb 3.2 oz)     Vital Signs with Ranges  Temp:  [98.2  F (36.8  C)-99.1  F (37.3  C)] 99.1  F (37.3  C)  Pulse:  [64-91] 81  Heart Rate:  [73-94] 79  Resp:  [10-23] 13  BP: (107-131)/(61-91) 113/74  SpO2:  [91 %-100 %] 98 %  I/O last 3 completed shifts:  In: 1392 [I.V.:1392]  Out: 1450 [Urine:1450]  PHYSICAL EXAM  GENERAL: Patient is in no distress. Alert and oriented.  HEENT: Oropharynx pink, moist. Pupils equal  HEART: Regular rate and rhythm. S1S2. No murmurs  LUNGS: Clear to auscultation bilaterally. No expiratory wheeze.  ABDOMEN: Soft, no abdominal tenderness, bowel sounds heard   NEURO: Cranial nerves II-XII intact. Motor and sensory system in normal range  EXTREMITIES: No pedal edema. 2+ peripheral pulses.  SKIN: Warm, dry. No rash or bruising.    )Consultations This Hospital Stay   GASTROENTEROLOGY IP CONSULT    Time Spent on this Encounter   ISreekanth, personally saw the patient today and spent greater than 30 minutes discharging this patient.  More than 50% of time spent in direct patient care, care coordination, patient/caregiver counseling, and formalizing plan of care.    Discharge Orders     Reason for your hospital stay   You were admitted with post polypectomy bleeding that has resolved.     Follow-up and recommended labs and tests    Follow up with primary care provider, Omayra Harris, within 7-14 days to evaluate after surgery and for hospital follow- up.  The following labs/tests are recommended: Hemoglobin level .     Activity   Your activity upon discharge: activity as tolerated     When to contact your care team   Call your primary doctor if you have any of the following: increased pain or bleeding or dizziness.     Discharge Instructions   Recommend over-the-counter iron supplements/diet rich in iron.  Follow-up  with [facility] as per schedule     Full Code     Diet   Follow this diet upon discharge: Orders Placed This Encounter     Regular Diet Adult         Discharge Medications   Current Discharge Medication List      CONTINUE these medications which have NOT CHANGED    Details   adapalene (DIFFERIN) 0.1 % gel Apply topically daily     Associated Diagnoses: Hashimoto's thyroiditis      levothyroxine (SYNTHROID/LEVOTHROID) 50 MCG tablet 50 mcg daily on weekday (5 days per week) and 2 pills (100 mcg) on weekend (2 days per week)  Qty: 108 tablet, Refills: 4    Associated Diagnoses: Hashimoto's thyroiditis      SERTRALINE HCL PO Take 50 mg by mouth every evening           Allergies   Allergies   Allergen Reactions     No Known Drug Allergies        Discharge Disposition   Discharged to home  Condition at discharge: Good    DATA  Most Recent 3 CBC's:  Recent Labs   Lab Test  05/24/18   1205  05/24/18   0555  05/23/18 2219 05/02/14   1543   WBC   --    --   10.3   --   7.0   HGB  11.5*  11.4*  11.9   < >  13.5   MCV   --    --   90   --   91   PLT   --    --   261   --   279    < > = values in this interval not displayed.      Most Recent 3 BMP's:  Recent Labs   Lab Test  05/23/18   2219  07/08/10   1507   NA  140  138   POTASSIUM  3.4  3.9   CHLORIDE  106  107   CO2  29  23   BUN  15  6   CR  0.75  0.69   ANIONGAP  5  8   FER  8.8  9.2   GLC  130*  85

## 2018-05-24 NOTE — PROGRESS NOTES
AOx4. VSS on RA. Up independently. Denies pain or rectal bleeding. No N/V or abdominal cramping. Discharge paperwork reviewed with patient.

## 2018-05-24 NOTE — PROGRESS NOTES
RECEIVING UNIT ED HANDOFF REVIEW    ED Nurse Handoff Report was reviewed by: Graciela Hayes on May 23, 2018 at 11:01 PM

## 2018-10-10 DIAGNOSIS — E06.3 HASHIMOTO'S THYROIDITIS: Primary | ICD-10-CM

## 2018-10-10 DIAGNOSIS — E06.3 HASHIMOTO'S THYROIDITIS: ICD-10-CM

## 2018-10-10 RX ORDER — LEVOTHYROXINE SODIUM 50 UG/1
TABLET ORAL
Qty: 100 TABLET | Refills: 0 | Status: SHIPPED | OUTPATIENT
Start: 2018-10-10 | End: 2018-11-06

## 2018-10-10 NOTE — TELEPHONE ENCOUNTER
levothyroxine (SYNTHROID/LEVOTHROID) 50 MCG tablet      Last Written Prescription Date:  8-25-17  Last Fill Quantity: 108,   # refills: 4  Last Office Visit : 6-23-17  Future Office visit:  11-16-18    Routing refill request to provider for review/approval because:  Over due lab- TSH

## 2018-10-19 ENCOUNTER — TELEPHONE (OUTPATIENT)
Dept: ENDOCRINOLOGY | Facility: CLINIC | Age: 33
End: 2018-10-19

## 2018-10-19 NOTE — TELEPHONE ENCOUNTER
"I let Omayra know the plan for f/u  For future. \"I refilled it but she should also get lab for TFT with me or PCP.  Order placed.   She can f/u with PCP yearly. \"    Thanks,   Aurelio   "

## 2018-10-31 ENCOUNTER — OFFICE VISIT (OUTPATIENT)
Dept: FAMILY MEDICINE | Facility: CLINIC | Age: 33
End: 2018-10-31
Payer: COMMERCIAL

## 2018-10-31 ENCOUNTER — TRANSFERRED RECORDS (OUTPATIENT)
Dept: HEALTH INFORMATION MANAGEMENT | Facility: CLINIC | Age: 33
End: 2018-10-31

## 2018-10-31 VITALS
TEMPERATURE: 98.5 F | SYSTOLIC BLOOD PRESSURE: 130 MMHG | DIASTOLIC BLOOD PRESSURE: 79 MMHG | HEART RATE: 63 BPM | BODY MASS INDEX: 24.71 KG/M2 | OXYGEN SATURATION: 95 % | RESPIRATION RATE: 16 BRPM | HEIGHT: 68 IN | WEIGHT: 163 LBS

## 2018-10-31 DIAGNOSIS — D50.9 IRON DEFICIENCY ANEMIA, UNSPECIFIED IRON DEFICIENCY ANEMIA TYPE: Primary | ICD-10-CM

## 2018-10-31 DIAGNOSIS — E06.3 HASHIMOTO'S THYROIDITIS: ICD-10-CM

## 2018-10-31 DIAGNOSIS — F32.1 MODERATE MAJOR DEPRESSION (H): ICD-10-CM

## 2018-10-31 LAB
ERYTHROCYTE [DISTWIDTH] IN BLOOD BY AUTOMATED COUNT: 14.3 % (ref 10–15)
HCT VFR BLD AUTO: 40.8 % (ref 35–47)
HGB BLD-MCNC: 13.5 G/DL (ref 11.7–15.7)
MCH RBC QN AUTO: 30.1 PG (ref 26.5–33)
MCHC RBC AUTO-ENTMCNC: 33.1 G/DL (ref 31.5–36.5)
MCV RBC AUTO: 91 FL (ref 78–100)
PAP SMEAR - HIM PATIENT REPORTED: NEGATIVE
PLATELET # BLD AUTO: 284 10E9/L (ref 150–450)
RBC # BLD AUTO: 4.48 10E12/L (ref 3.8–5.2)
WBC # BLD AUTO: 7.2 10E9/L (ref 4–11)

## 2018-10-31 PROCEDURE — 99203 OFFICE O/P NEW LOW 30 MIN: CPT | Performed by: FAMILY MEDICINE

## 2018-10-31 PROCEDURE — 85027 COMPLETE CBC AUTOMATED: CPT | Performed by: INTERNAL MEDICINE

## 2018-10-31 PROCEDURE — 84443 ASSAY THYROID STIM HORMONE: CPT | Performed by: INTERNAL MEDICINE

## 2018-10-31 PROCEDURE — 84439 ASSAY OF FREE THYROXINE: CPT | Performed by: INTERNAL MEDICINE

## 2018-10-31 PROCEDURE — 36415 COLL VENOUS BLD VENIPUNCTURE: CPT | Performed by: INTERNAL MEDICINE

## 2018-10-31 NOTE — NURSING NOTE
"Chief Complaint   Patient presents with     Thyroid Problem     /79  Pulse 63  Temp 98.5  F (36.9  C) (Oral)  Resp 16  Ht 5' 8\" (1.727 m)  Wt 163 lb (73.9 kg)  LMP 10/11/2018  SpO2 95%  BMI 24.78 kg/m2 Estimated body mass index is 24.78 kg/(m^2) as calculated from the following:    Height as of this encounter: 5' 8\" (1.727 m).    Weight as of this encounter: 163 lb (73.9 kg).  bp completed using cuff size: regular       Health Maintenance addressed:  Pap Smear and PHQ9    Pt had PAP done on this date 03/01/2018 , with this [facility] , results were normal    Rosetta Olivier MA     "

## 2018-10-31 NOTE — PROGRESS NOTES
SUBJECTIVE:   Omayra Stewart is a 33 year old female who presents to clinic today for the following health issues:      Hypothyroidism Follow-up- she denies any diarrhea, no heat intolerance , no tremors, no palpitations , lost weight but that was intentional.      Since last visit, patient describes the following symptoms: weight loss of 25 lbs since middle of June2018 but this is intentional , she has been on a healthy diet and exercise , avoiding carbs , eating mostly lean meats , veggies , fruits, nuts etc     Anxiety is controlled on the Zoloft 50 mg a day       Amount of exercise or physical activity: 4-5 days/week for an average of 15-30 minutes    Problems taking medications regularly: No    Medication side effects: none    Diet: lower carbs and high in protein     2) she had anemia a few month ago but apparently it was after she got a colonoscopy and had four polyps removed , she had bleeding in the colon after that and hence the anemia, she is not taking her iron pills any more so she wants to check her iron levels today .        Problem list and histories reviewed & adjusted, as indicated.  Additional history: as documented    Patient Active Problem List   Diagnosis     Moderate major depression (H)     CARDIOVASCULAR SCREENING; LDL GOAL LESS THAN 160     Insomnia     Hashimoto's thyroiditis     GI bleed     Past Surgical History:   Procedure Laterality Date     NO HISTORY OF SURGERY       SIGMOIDOSCOPY FLEXIBLE N/A 5/24/2018    Procedure: SIGMOIDOSCOPY FLEXIBLE;  flexible sigmoidoscopy;  Surgeon: Sanchez Osborn DO;  Location:  GI       Social History   Substance Use Topics     Smoking status: Never Smoker     Smokeless tobacco: Never Used     Alcohol use Yes      Comment: 0-2 times per month     Family History   Problem Relation Age of Onset     Cerebrovascular Disease Maternal Grandmother      Cancer - colorectal Maternal Grandmother      Colon Cancer Maternal Grandmother      Cancer -  "colorectal Maternal Grandfather      Colon Cancer Maternal Grandfather      Cerebrovascular Disease Paternal Grandmother      Cerebrovascular Disease Paternal Grandfather          Current Outpatient Prescriptions   Medication Sig Dispense Refill     adapalene (DIFFERIN) 0.1 % gel Apply topically daily        levothyroxine (SYNTHROID/LEVOTHROID) 50 MCG tablet Take 1 tab daily on weekdays ( 5 per week) and 2 tabs ( 100 mcg) on weekends (2 day per week) 100 tablet 0     SERTRALINE HCL PO Take 50 mg by mouth every evening       Allergies   Allergen Reactions     No Known Drug Allergies      Recent Labs   Lab Test  05/23/18   2219  08/24/17   1544  06/23/17   1523   CR  0.75   --    --    GFRESTIMATED  88   --    --    GFRESTBLACK  >90   --    --    POTASSIUM  3.4   --    --    TSH   --   0.87  3.03      BP Readings from Last 3 Encounters:   10/31/18 130/79   05/24/18 110/60   06/23/17 123/77    Wt Readings from Last 3 Encounters:   10/31/18 163 lb (73.9 kg)   05/23/18 180 lb 3.2 oz (81.7 kg)   06/23/17 180 lb 1.6 oz (81.7 kg)                  Labs reviewed in EPIC    Reviewed and updated as needed this visit by clinical staff       Reviewed and updated as needed this visit by Provider         ROS:  Constitutional, HEENT, cardiovascular, pulmonary, GI, , musculoskeletal, neuro, skin, endocrine and psych systems are negative, except as otherwise noted.    OBJECTIVE:     /79  Pulse 63  Temp 98.5  F (36.9  C) (Oral)  Resp 16  Ht 5' 8\" (1.727 m)  Wt 163 lb (73.9 kg)  LMP 10/11/2018  SpO2 95%  BMI 24.78 kg/m2  Body mass index is 24.78 kg/(m^2).  GENERAL: healthy, alert and no distress  EYES: Eyes grossly normal to inspection, PERRL and conjunctivae and sclerae normal  NECK: no adenopathy, no asymmetry, masses, or scars and thyroid normal to palpation  RESP: lungs clear to auscultation - no rales, rhonchi or wheezes  CV: regular rate and rhythm, normal S1 S2, no S3 or S4, no murmur, click or rub, no peripheral " edema and peripheral pulses strong  ABDOMEN: soft, nontender, no hepatosplenomegaly, no masses and bowel sounds normal  MS: no gross musculoskeletal defects noted, no edema    Diagnostic Test Results:  Results for orders placed or performed in visit on 10/31/18 (from the past 24 hour(s))   CBC with platelets   Result Value Ref Range    WBC 7.2 4.0 - 11.0 10e9/L    RBC Count 4.48 3.8 - 5.2 10e12/L    Hemoglobin 13.5 11.7 - 15.7 g/dL    Hematocrit 40.8 35.0 - 47.0 %    MCV 91 78 - 100 fl    MCH 30.1 26.5 - 33.0 pg    MCHC 33.1 31.5 - 36.5 g/dL    RDW 14.3 10.0 - 15.0 %    Platelet Count 284 150 - 450 10e9/L       ASSESSMENT/PLAN:       1. Hashimoto's thyroiditis  Will check and then see if she would need an adjustment in the dose , currently she is taking 50 mcg daily five days a week and 100 mcg on the weekends .  - TSH  - T4 free    2. Iron deficiency anemia, unspecified iron deficiency anemia type  Checked today , she does not have anemia will need to monitor .  - CBC with platelets    RTC if no improving or worsening.  Pt is aware  and comfortable with the current plan.      Autumn Mejia MD  [facility]

## 2018-10-31 NOTE — LETTER
Patient:  Omayra Setwart  :   1985  MRN:     [id number]        Ms.Jennifer Ebony Stewart  [address]  [place] [address]        2018    Dear ,    We are writing to inform you of your test results.    Your thyroid levels are in good range. Please continue with current dose. Given stable dose, please follow up with your primary provider annually and she can refill your medication.     Resulted Orders   TSH   Result Value Ref Range    TSH 1.53 0.40 - 4.00 mU/L   T4 free   Result Value Ref Range    T4 Free 1.09 0.76 - 1.46 ng/dL     If you have any questions, please do not hesitate to call [phone] and ask for Endocrinology clinic.      After clinic hours, please contact [phone] and ask for Endocrinologist-on call    Sincerely,    Aurelio Santamaria MD  Endocrinology      [id number]  1985

## 2018-10-31 NOTE — MR AVS SNAPSHOT
After Visit Summary   10/31/2018    Omayra Stewart    MRN: [id number]           Patient Information     Date Of Birth          1985        Visit Information        Provider Department      10/31/2018 4:20 PM Autumn Mejia MD [facility]        Today's Diagnoses     Iron deficiency anemia, unspecified iron deficiency anemia type    -  1    Hashimoto's thyroiditis        Moderate major depression (H)           Follow-ups after your visit        Follow-up notes from your care team     Return in about 3 months (around 1/31/2019).      Your next 10 appointments already scheduled     Nov 16, 2018  3:50 PM CST   (Arrive by 3:35 PM)   RETURN ENDOCRINE with Aurelio Santamaria MD   [facility] ([facility])    [address] [place] [id number] [phone]              Who to contact     If you have questions or need follow up information about today's clinic visit or your schedule please contact [facility] directly at [phone].  Normal or non-critical lab and imaging results will be communicated to you by MyChart, letter or phone within 4 business days after the clinic has received the results. If you do not hear from us within 7 days, please contact the clinic through [employer] or phone. If you have a critical or abnormal lab result, we will notify you by phone as soon as possible.  Submit refill requests through [employer] or call your pharmacy and they will forward the refill request to us. Please allow 3 business days for your refill to be completed.          Additional Information About Your Visit        MyChart Information     [employer] gives you secure access to your electronic health record. If you see a primary care provider, you can also send messages to your care team and make appointments. If you have questions, please call your primary care clinic.  If you do not have a primary care provider, please  "call [phone] and they will assist you.        Care EveryWhere ID     This is your Care EveryWhere ID. This could be used by other organizations to access your [place] medical records  [id number]        Your Vitals Were     Pulse Temperature Respirations Height Last Period Pulse Oximetry    63 98.5  F (36.9  C) (Oral) 16 5' 8\" (1.727 m) 10/11/2018 95%    BMI (Body Mass Index)                   24.78 kg/m2            Blood Pressure from Last 3 Encounters:   10/31/18 130/79   05/24/18 110/60   06/23/17 123/77    Weight from Last 3 Encounters:   10/31/18 163 lb (73.9 kg)   05/23/18 180 lb 3.2 oz (81.7 kg)   06/23/17 180 lb 1.6 oz (81.7 kg)              We Performed the Following     CBC with platelets     T4 free     TSH        Primary Care Provider Office Phone # Fax #    Autumn Mejia [phone] [address] [address]        Equal Access to Services     [facility]: Hadii tyler ku hadasho Solaura, waaxda luqadaha, qaybta kaalmada won, jesus gurrola . So [facility] [phone].    ATENCIÓN: Si habla español, tiene a brown disposición servicios gratuitos de asistencia lingüística. Jamison al [phone].    We comply with applicable federal civil rights laws and [place] laws. We do not discriminate on the basis of race, color, national origin, age, disability, sex, sexual orientation, or gender identity.            Thank you!     Thank you for choosing [facility]  for your care. Our goal is always to provide you with excellent care. Hearing back from our patients is one way we can continue to improve our services. Please take a few minutes to complete the written survey that you may receive in the mail after your visit with us. Thank you!             Your Updated Medication List - Protect others around you: Learn how to safely use, store and throw away your medicines at www.disposemymeds.org.          This list is accurate as " of 10/31/18 10:19 PM.  Always use your most recent med list.                   Brand Name Dispense Instructions for use Diagnosis    adapalene 0.1 % gel    DIFFERIN     Apply topically daily    Hashimoto's thyroiditis       levothyroxine 50 MCG tablet    SYNTHROID/LEVOTHROID    100 tablet    Take 1 tab daily on weekdays ( 5 per week) and 2 tabs ( 100 mcg) on weekends (2 day per week)    Hashimoto's thyroiditis       SERTRALINE HCL PO      Take 50 mg by mouth every evening

## 2018-11-01 LAB
T4 FREE SERPL-MCNC: 1.09 NG/DL (ref 0.76–1.46)
TSH SERPL DL<=0.005 MIU/L-ACNC: 1.53 MU/L (ref 0.4–4)

## 2018-11-04 ENCOUNTER — MYC MEDICAL ADVICE (OUTPATIENT)
Dept: FAMILY MEDICINE | Facility: CLINIC | Age: 33
End: 2018-11-04

## 2018-11-04 DIAGNOSIS — E06.3 HASHIMOTO'S THYROIDITIS: ICD-10-CM

## 2018-11-06 RX ORDER — LEVOTHYROXINE SODIUM 50 UG/1
TABLET ORAL
Qty: 100 TABLET | Refills: 2 | Status: SHIPPED | OUTPATIENT
Start: 2018-11-06 | End: 2019-10-02

## 2018-11-06 NOTE — TELEPHONE ENCOUNTER
LS,   Patient wanting Levothyroxine refilled for the next year  Pended 90 day with 2 additional refills if you approve  Thanks,  Karma MO RN

## 2019-03-08 ENCOUNTER — OFFICE VISIT (OUTPATIENT)
Dept: FAMILY MEDICINE | Facility: CLINIC | Age: 34
End: 2019-03-08
Payer: COMMERCIAL

## 2019-03-08 VITALS
TEMPERATURE: 98.2 F | OXYGEN SATURATION: 97 % | BODY MASS INDEX: 24.64 KG/M2 | SYSTOLIC BLOOD PRESSURE: 112 MMHG | DIASTOLIC BLOOD PRESSURE: 80 MMHG | HEART RATE: 68 BPM | WEIGHT: 162.06 LBS

## 2019-03-08 DIAGNOSIS — C43.59 MALIGNANT MELANOMA OF SKIN OF TRUNK (H): ICD-10-CM

## 2019-03-08 DIAGNOSIS — Z01.818 PREOP GENERAL PHYSICAL EXAM: Primary | ICD-10-CM

## 2019-03-08 PROCEDURE — 99214 OFFICE O/P EST MOD 30 MIN: CPT | Performed by: FAMILY MEDICINE

## 2019-03-08 RX ORDER — SERTRALINE HYDROCHLORIDE 100 MG/1
TABLET, FILM COATED ORAL
Refills: 2 | COMMUNITY
Start: 2019-02-20

## 2019-03-08 RX ORDER — SPIRONOLACTONE 100 MG/1
150 TABLET, FILM COATED ORAL
COMMUNITY
Start: 2018-12-21

## 2019-03-08 RX ORDER — SPIRONOLACTONE 50 MG/1
TABLET, FILM COATED ORAL
COMMUNITY
Start: 2019-02-21 | End: 2019-10-02

## 2019-03-08 RX ORDER — DAPSONE 50 MG/G
GEL TOPICAL
COMMUNITY
Start: 2019-02-21

## 2019-03-08 RX ORDER — CEPHALEXIN 500 MG/1
CAPSULE ORAL
Refills: 0 | COMMUNITY
Start: 2019-03-01 | End: 2019-07-09

## 2019-03-08 RX ORDER — ERGOCALCIFEROL 1.25 MG/1
CAPSULE, LIQUID FILLED ORAL
COMMUNITY
Start: 2019-03-05 | End: 2019-07-09

## 2019-03-08 ASSESSMENT — PAIN SCALES - GENERAL: PAINLEVEL: NO PAIN (0)

## 2019-03-08 NOTE — PROGRESS NOTES
[facility]  [address] [place]  [facility] [address] [phone]  Dept: [phone]    PRE-OP EVALUATION:  Today's date: 3/8/2019    Omayra Stewart (: 1985) presents for pre-operative evaluation assessment as requested by Dr. Smith.  She requires evaluation and anesthesia risk assessment prior to undergoing surgery/procedure for treatment of Mole reomval .    Fax number for surgical facility: [facility] Fax: [phone] and other Fax:[phone]  Primary Physician: Autumn Mejia  Type of Anesthesia Anticipated: to be determined    Patient has a Health Care Directive or Living Will:  YES     Preop Questions 3/7/2019   Who is doing your surgery? Dr. Smith   What are you having done? Mole being removed due to melanoma   Date of Surgery/Procedure: 2019   Facility or Hospital where procedure/surgery will be performed: [facility]   1.  Do you have a history of Heart attack, stroke, stent, coronary bypass surgery, or other heart surgery? No   2.  Do you ever have any pain or discomfort in your chest? No   3.  Do you have a history of  Heart Failure? No   4.   Are you troubled by shortness of breath when:  walking on a level surface, or up a slight hill, or at night? No   5.  Do you currently have a cold, bronchitis or other respiratory infection? No   6.  Do you have a cough, shortness of breath, or wheezing? No   7.  Do you sometimes get pains in the calves of your legs when you walk? No   8. Do you or anyone in your family have previous history of blood clots? No   9.  Do you or does anyone in your family have a serious bleeding problem such as prolonged bleeding following surgeries or cuts? No   10. Have you ever had problems with anemia or been told to take iron pills? No   11. Have you had any abnormal blood loss such as black, tarry or bloody stools, or abnormal vaginal bleeding? YES - after polypectomy with colonoscopy  in 2018     12. Have you ever had a blood transfusion? No   13. Have you or any of your relatives ever had problems with anesthesia? YES - in relatives , mom and cousin not pt    14. Do you have sleep apnea, excessive snoring or daytime drowsiness? No   15. Do you have any prosthetic heart valves? No   16. Do you have prosthetic joints? No   17. Is there any chance that you may be pregnant? No         HPI:     HPI related to upcoming procedure:       See problem list for active medical problems.  Problems all longstanding and stable, except as noted/documented.  See ROS for pertinent symptoms related to these conditions.                                                                                                                                                          .    MEDICAL HISTORY:     Patient Active Problem List    Diagnosis Date Noted     Malignant melanoma of skin of trunk (H) 03/08/2019     Priority: Medium     GI bleed 05/23/2018     Priority: Medium     Hashimoto's thyroiditis 01/09/2014     Priority: Medium     Insomnia 08/25/2011     Priority: Medium     CARDIOVASCULAR SCREENING; LDL GOAL LESS THAN 160 10/31/2010     Priority: Medium     Moderate major depression (H) 07/08/2010     Priority: Medium      Past Medical History:   Diagnosis Date     Acne      Depression      Insomnia      Past Surgical History:   Procedure Laterality Date     NO HISTORY OF SURGERY       SIGMOIDOSCOPY FLEXIBLE N/A 5/24/2018    Procedure: SIGMOIDOSCOPY FLEXIBLE;  flexible sigmoidoscopy;  Surgeon: Sanchez Osborn DO;  Location:  GI     Current Outpatient Medications   Medication Sig Dispense Refill     adapalene (DIFFERIN) 0.1 % gel Apply topically daily        cephALEXin (KEFLEX) 500 MG capsule   0     Dapsone 5 % GEL        levothyroxine (SYNTHROID/LEVOTHROID) 50 MCG tablet Take 1 tab daily on weekdays ( 5 per week) and 2 tabs ( 100 mcg) on weekends (2 day per week) 100 tablet 2     sertraline (ZOLOFT) 100 MG  tablet TK 1 T PO QD  2     spironolactone (ALDACTONE) 100 MG tablet Take 150 mg by mouth At night       spironolactone (ALDACTONE) 50 MG tablet Takes in the AM       vitamin D2 (ERGOCALCIFEROL) 25710 units (1250 mcg) capsule        OTC products: None, except as noted above    Allergies   Allergen Reactions     No Known Drug Allergies       Latex Allergy: NO    Social History     Tobacco Use     Smoking status: Never Smoker     Smokeless tobacco: Never Used   Substance Use Topics     Alcohol use: Yes     Comment: 0-2 times per month     History   Drug Use No       REVIEW OF SYSTEMS:   Constitutional, neuro, ENT, endocrine, pulmonary, cardiac, gastrointestinal, genitourinary, musculoskeletal, integument and psychiatric systems are negative, except as otherwise noted.    EXAM:   /80   Pulse 68   Temp 98.2  F (36.8  C) (Oral)   Wt 73.5 kg (162 lb 1 oz)   LMP 02/18/2019   SpO2 97%   Breastfeeding? No   BMI 24.64 kg/m      GENERAL APPEARANCE: healthy, alert and no distress     EYES: EOMI, PERRL     HENT: ear canals and TM's normal and nose and mouth without ulcers or lesions     NECK: no adenopathy, no asymmetry, masses, or scars and thyroid normal to palpation     RESP: lungs clear to auscultation - no rales, rhonchi or wheezes     CV: regular rates and rhythm, normal S1 S2, no S3 or S4 and no murmur, click or rub     ABDOMEN:  soft, nontender, no HSM or masses and bowel sounds normal     MS: extremities normal- no gross deformities noted, no evidence of inflammation in joints, FROM in all extremities.     SKIN: no suspicious lesions or rashes     NEURO: Normal strength and tone, sensory exam grossly normal, mentation intact and speech normal     PSYCH: mentation appears normal. and affect normal/bright     LYMPHATICS: No cervical adenopathy    DIAGNOSTICS:   No labs or EKG required for low risk surgery (cataract, skin procedure, breast biopsy, etc)    Recent Labs   Lab Test 10/31/18  1640 05/24/18  1800   05/23/18  2219   HGB 13.5 11.2*   < > 11.9     --   --  261   NA  --   --   --  140   POTASSIUM  --   --   --  3.4   CR  --   --   --  0.75    < > = values in this interval not displayed.        IMPRESSION:   Reason for surgery/procedure: Melanoma, Surgical removal   Diagnosis/reason for consult: preoperative clearing     The proposed surgical procedure is considered LOW risk.    REVISED CARDIAC RISK INDEX  The patient has the following serious cardiovascular risks for perioperative complications such as (MI, PE, VFib and 3  AV Block):  No serious cardiac risks      The patient has the following additional risks for perioperative complications:  No identified additional risks      ICD-10-CM    1. Preop general physical exam Z01.818    2. Malignant melanoma of skin of trunk (H) C43.59        RECOMMENDATIONS:         --Patient is to take all scheduled medications on the day of surgery EXCEPT for modifications listed below.    APPROVAL GIVEN to proceed with proposed procedure, without further diagnostic evaluation       Signed Electronically by: Autumn Mejia MD    Copy of this evaluation report is provided to requesting physician.    [facility] Preop Guidelines    Revised Cardiac Risk Index

## 2019-03-08 NOTE — NURSING NOTE
"Chief Complaint   Patient presents with     Pre-Op Exam     /80   Pulse 68   Temp 98.2  F (36.8  C) (Oral)   Wt 73.5 kg (162 lb 1 oz)   LMP 02/18/2019   SpO2 97%   Breastfeeding? No   BMI 24.64 kg/m   Estimated body mass index is 24.64 kg/m  as calculated from the following:    Height as of 10/31/18: 1.727 m (5' 8\").    Weight as of this encounter: 73.5 kg (162 lb 1 oz).  bp completed using cuff size: regular      Health Maintenance addressed:  NONE    n/a              "

## 2019-03-12 ENCOUNTER — TELEPHONE (OUTPATIENT)
Dept: FAMILY MEDICINE | Facility: CLINIC | Age: 34
End: 2019-03-12

## 2019-03-12 NOTE — TELEPHONE ENCOUNTER
Reason for Call:  Other Fax Pre-op     Detailed comments: [facility] called to get the Pre-op from 3/8    Please Fax it to [phone]    Call taken on 3/12/2019 at 10:11 AM by Doris Lester

## 2019-03-13 ENCOUNTER — TRANSFERRED RECORDS (OUTPATIENT)
Dept: HEALTH INFORMATION MANAGEMENT | Facility: CLINIC | Age: 34
End: 2019-03-13

## 2019-03-20 ENCOUNTER — TRANSFERRED RECORDS (OUTPATIENT)
Dept: HEALTH INFORMATION MANAGEMENT | Facility: CLINIC | Age: 34
End: 2019-03-20

## 2019-04-01 ENCOUNTER — TRANSFERRED RECORDS (OUTPATIENT)
Dept: HEALTH INFORMATION MANAGEMENT | Facility: CLINIC | Age: 34
End: 2019-04-01

## 2019-04-09 ENCOUNTER — TRANSFERRED RECORDS (OUTPATIENT)
Dept: HEALTH INFORMATION MANAGEMENT | Facility: CLINIC | Age: 34
End: 2019-04-09

## 2019-04-17 ENCOUNTER — TRANSFERRED RECORDS (OUTPATIENT)
Dept: HEALTH INFORMATION MANAGEMENT | Facility: CLINIC | Age: 34
End: 2019-04-17

## 2019-04-24 ENCOUNTER — APPOINTMENT (OUTPATIENT)
Age: 34
Setting detail: DERMATOLOGY
End: 2019-04-24

## 2019-04-24 VITALS — HEIGHT: 68 IN | WEIGHT: 160 LBS

## 2019-04-24 DIAGNOSIS — Z85.820 PERSONAL HISTORY OF MALIGNANT MELANOMA OF SKIN: ICD-10-CM

## 2019-04-24 DIAGNOSIS — L70.0 ACNE VULGARIS: ICD-10-CM

## 2019-04-24 PROCEDURE — OTHER PRESCRIPTION: OTHER

## 2019-04-24 PROCEDURE — OTHER PRESCRIPTION MEDICATION MANAGEMENT: OTHER

## 2019-04-24 PROCEDURE — 99213 OFFICE O/P EST LOW 20 MIN: CPT

## 2019-04-24 PROCEDURE — OTHER COUNSELING: OTHER

## 2019-04-24 RX ORDER — SPIRONOLACTONE 100 MG/1
100 TABLET, FILM COATED ORAL BID
Qty: 60 | Refills: 3 | Status: ERX | COMMUNITY
Start: 2019-04-24

## 2019-04-24 ASSESSMENT — LOCATION SIMPLE DESCRIPTION DERM
LOCATION SIMPLE: LEFT UPPER BACK
LOCATION SIMPLE: LEFT CHEEK

## 2019-04-24 ASSESSMENT — LOCATION DETAILED DESCRIPTION DERM
LOCATION DETAILED: LEFT INFERIOR CENTRAL MALAR CHEEK
LOCATION DETAILED: LEFT MID-UPPER BACK

## 2019-04-24 ASSESSMENT — LOCATION ZONE DERM
LOCATION ZONE: FACE
LOCATION ZONE: TRUNK

## 2019-04-24 NOTE — HPI: MELANOMA F/U (HISTORY OF MALIGNANT MELANOMA)
What Is The Reason For Today's Visit?: History of Melanoma
What Stage Is The Melanoma?: Stage IIIA
Year Excised?: 2019
Additional History: She has been seen by Dr. Pelayo for a wide surgical excision as well as a lymph node bx. which was positive. She is being followed by Dr. Senior with immunotherapy q3 wks for 1 year.

## 2019-04-24 NOTE — PROCEDURE: COUNSELING
Doxycycline Counseling:  Patient counseled regarding possible photosensitivity and increased risk for sunburn.  Patient instructed to avoid sunlight, if possible.  When exposed to sunlight, patients should wear protective clothing, sunglasses, and sunscreen.  The patient was instructed to call the office immediately if the following severe adverse effects occur:  hearing changes, easy bruising/bleeding, severe headache, or vision changes.  The patient verbalized understanding of the proper use and possible adverse effects of doxycycline.  All of the patient's questions and concerns were addressed.
Birth Control Pills Pregnancy And Lactation Text: This medication should be avoided if pregnant and for the first 30 days post-partum.
Doxycycline Pregnancy And Lactation Text: This medication is Pregnancy Category D and not consider safe during pregnancy. It is also excreted in breast milk but is considered safe for shorter treatment courses.
Spironolactone Pregnancy And Lactation Text: This medication can cause feminization of the male fetus and should be avoided during pregnancy. The active metabolite is also found in breast milk.
Topical Clindamycin Counseling: Patient counseled that this medication may cause skin irritation or allergic reactions.  In the event of skin irritation, the patient was advised to reduce the amount of the drug applied or use it less frequently.   The patient verbalized understanding of the proper use and possible adverse effects of clindamycin.  All of the patient's questions and concerns were addressed.
Spironolactone Counseling: Patient advised regarding risks of diarrhea, abdominal pain, hyperkalemia, birth defects (for female patients), liver toxicity and renal toxicity. The patient may need blood work to monitor liver and kidney function and potassium levels while on therapy. The patient verbalized understanding of the proper use and possible adverse effects of spironolactone.  All of the patient's questions and concerns were addressed.
Isotretinoin Pregnancy And Lactation Text: This medication is Pregnancy Category X and is considered extremely dangerous during pregnancy. It is unknown if it is excreted in breast milk.
Tazorac Counseling:  Patient advised that medication is irritating and drying.  Patient may need to apply sparingly and wash off after an hour before eventually leaving it on overnight.  The patient verbalized understanding of the proper use and possible adverse effects of tazorac.  All of the patient's questions and concerns were addressed.
When Should The Patient Follow-Up For Their Next Full-Body Skin Exam?: 3 Months
High Dose Vitamin A Pregnancy And Lactation Text: High dose vitamin A therapy is contraindicated during pregnancy and breast feeding.
Erythromycin Pregnancy And Lactation Text: This medication is Pregnancy Category B and is considered safe during pregnancy. It is also excreted in breast milk.
Erythromycin Counseling:  I discussed with the patient the risks of erythromycin including but not limited to GI upset, allergic reaction, drug rash, diarrhea, increase in liver enzymes, and yeast infections.
Dapsone Pregnancy And Lactation Text: This medication is Pregnancy Category C and is not considered safe during pregnancy or breast feeding.
Bactrim Counseling:  I discussed with the patient the risks of sulfa antibiotics including but not limited to GI upset, allergic reaction, drug rash, diarrhea, dizziness, photosensitivity, and yeast infections.  Rarely, more serious reactions can occur including but not limited to aplastic anemia, agranulocytosis, methemoglobinemia, blood dyscrasias, liver or kidney failure, lung infiltrates or desquamative/blistering drug rashes.
Azithromycin Pregnancy And Lactation Text: This medication is considered safe during pregnancy and is also secreted in breast milk.
Tetracycline Pregnancy And Lactation Text: This medication is Pregnancy Category D and not consider safe during pregnancy. It is also excreted in breast milk.
Use Enhanced Medication Counseling?: No
Dapsone Counseling: I discussed with the patient the risks of dapsone including but not limited to hemolytic anemia, agranulocytosis, rashes, methemoglobinemia, kidney failure, peripheral neuropathy, headaches, GI upset, and liver toxicity.  Patients who start dapsone require monitoring including baseline LFTs and weekly CBCs for the first month, then every month thereafter.  The patient verbalized understanding of the proper use and possible adverse effects of dapsone.  All of the patient's questions and concerns were addressed.
High Dose Vitamin A Counseling: Side effects reviewed, pt to contact office should one occur.
Topical Clindamycin Pregnancy And Lactation Text: This medication is Pregnancy Category B and is considered safe during pregnancy. It is unknown if it is excreted in breast milk.
Detail Level: Detailed
Topical Retinoid Pregnancy And Lactation Text: This medication is Pregnancy Category C. It is unknown if this medication is excreted in breast milk.
Tazorac Pregnancy And Lactation Text: This medication is not safe during pregnancy. It is unknown if this medication is excreted in breast milk.
Tetracycline Counseling: Patient counseled regarding possible photosensitivity and increased risk for sunburn.  Patient instructed to avoid sunlight, if possible.  When exposed to sunlight, patients should wear protective clothing, sunglasses, and sunscreen.  The patient was instructed to call the office immediately if the following severe adverse effects occur:  hearing changes, easy bruising/bleeding, severe headache, or vision changes.  The patient verbalized understanding of the proper use and possible adverse effects of tetracycline.  All of the patient's questions and concerns were addressed. Patient understands to avoid pregnancy while on therapy due to potential birth defects.
Topical Sulfur Applications Pregnancy And Lactation Text: This medication is Pregnancy Category C and has an unknown safety profile during pregnancy. It is unknown if this topical medication is excreted in breast milk.
Birth Control Pills Counseling: Birth Control Pill Counseling: I discussed with the patient the potential side effects of OCPs including but not limited to increased risk of stroke, heart attack, thrombophlebitis, deep venous thrombosis, hepatic adenomas, breast changes, GI upset, headaches, and depression.  The patient verbalized understanding of the proper use and possible adverse effects of OCPs. All of the patient's questions and concerns were addressed.
Detail Level: Zone
Benzoyl Peroxide Pregnancy And Lactation Text: This medication is Pregnancy Category C. It is unknown if benzoyl peroxide is excreted in breast milk.
Azithromycin Counseling:  I discussed with the patient the risks of azithromycin including but not limited to GI upset, allergic reaction, drug rash, diarrhea, and yeast infections.
Isotretinoin Counseling: Patient should get monthly blood tests, not donate blood, not drive at night if vision affected, not share medication, and not undergo elective surgery for 6 months after tx completed. Side effects reviewed, pt to contact office should one occur.
Minocycline Counseling: Patient advised regarding possible photosensitivity and discoloration of the teeth, skin, lips, tongue and gums.  Patient instructed to avoid sunlight, if possible.  When exposed to sunlight, patients should wear protective clothing, sunglasses, and sunscreen.  The patient was instructed to call the office immediately if the following severe adverse effects occur:  hearing changes, easy bruising/bleeding, severe headache, or vision changes.  The patient verbalized understanding of the proper use and possible adverse effects of minocycline.  All of the patient's questions and concerns were addressed.
Benzoyl Peroxide Counseling: Patient counseled that medicine may cause skin irritation and bleach clothing.  In the event of skin irritation, the patient was advised to reduce the amount of the drug applied or use it less frequently.   The patient verbalized understanding of the proper use and possible adverse effects of benzoyl peroxide.  All of the patient's questions and concerns were addressed.
Topical Retinoid counseling:  Patient advised to apply a pea-sized amount only at bedtime and wait 30 minutes after washing their face before applying.  If too drying, patient may add a non-comedogenic moisturizer. The patient verbalized understanding of the proper use and possible adverse effects of retinoids.  All of the patient's questions and concerns were addressed.
Topical Sulfur Applications Counseling: Topical Sulfur Counseling: Patient counseled that this medication may cause skin irritation or allergic reactions.  In the event of skin irritation, the patient was advised to reduce the amount of the drug applied or use it less frequently.   The patient verbalized understanding of the proper use and possible adverse effects of topical sulfur application.  All of the patient's questions and concerns were addressed.
Bactrim Pregnancy And Lactation Text: This medication is Pregnancy Category D and is known to cause fetal risk.  It is also excreted in breast milk.

## 2019-04-24 NOTE — PROCEDURE: PRESCRIPTION MEDICATION MANAGEMENT
Continue Regimen: Dapsone as directed.
Plan: Increase spironolactone as it has been improving her acne but acne is still present.
Modify Regimen: Increase Spironolactone 100 mg Take  1po BID
Detail Level: Zone
Render In Strict Bullet Format?: No

## 2019-05-09 ENCOUNTER — TRANSFERRED RECORDS (OUTPATIENT)
Dept: HEALTH INFORMATION MANAGEMENT | Facility: CLINIC | Age: 34
End: 2019-05-09

## 2019-05-30 ENCOUNTER — TRANSFERRED RECORDS (OUTPATIENT)
Dept: HEALTH INFORMATION MANAGEMENT | Facility: CLINIC | Age: 34
End: 2019-05-30

## 2019-06-20 ENCOUNTER — TRANSFERRED RECORDS (OUTPATIENT)
Dept: HEALTH INFORMATION MANAGEMENT | Facility: CLINIC | Age: 34
End: 2019-06-20

## 2019-07-09 ENCOUNTER — OFFICE VISIT (OUTPATIENT)
Dept: INTERNAL MEDICINE | Facility: CLINIC | Age: 34
End: 2019-07-09
Payer: COMMERCIAL

## 2019-07-09 VITALS
SYSTOLIC BLOOD PRESSURE: 100 MMHG | RESPIRATION RATE: 16 BRPM | WEIGHT: 174.5 LBS | TEMPERATURE: 98.1 F | OXYGEN SATURATION: 99 % | BODY MASS INDEX: 26.53 KG/M2 | HEART RATE: 74 BPM | DIASTOLIC BLOOD PRESSURE: 70 MMHG

## 2019-07-09 DIAGNOSIS — L56.4 POLYMORPHIC LIGHT ERUPTION: Primary | ICD-10-CM

## 2019-07-09 PROCEDURE — 99213 OFFICE O/P EST LOW 20 MIN: CPT | Performed by: PHYSICIAN ASSISTANT

## 2019-07-09 RX ORDER — TRIAMCINOLONE ACETONIDE 1 MG/G
OINTMENT TOPICAL 2 TIMES DAILY
Qty: 80 G | Refills: 0 | Status: SHIPPED | OUTPATIENT
Start: 2019-07-09 | End: 2019-07-09

## 2019-07-09 RX ORDER — DESONIDE 0.5 MG/ML
LOTION TOPICAL 2 TIMES DAILY
Qty: 59 ML | Refills: 0 | Status: SHIPPED | OUTPATIENT
Start: 2019-07-09

## 2019-07-09 RX ORDER — TRIAMCINOLONE ACETONIDE 1 MG/G
OINTMENT TOPICAL 2 TIMES DAILY
Qty: 80 G | Refills: 0 | Status: SHIPPED | OUTPATIENT
Start: 2019-07-09

## 2019-07-09 NOTE — PROGRESS NOTES
Subjective     Omayra Stewart is a 34 year old female who presents to clinic today for the following health issues:    HPI   Rash      Duration: Sunday night around 5    Description  Location: on chest, spread to arms, legs, hands, stomach, face, and upper thighs  Itching: moderate  Burning as well     Intensity:  moderate    Accompanying signs and symptoms: red and little bumps     History (similar episodes/previous evaluation): None    Precipitating or alleviating factors:  New exposures:  None  Recent travel: no      Therapies tried and outcome: hydrocortisone cream and oatmeal bath  Benedryl, zyrtec with no relief    Patient has been on Keytruda since April. Patient was on amoxicillin which ended on the 4th of July. Patient was outside in bikini on July 4th.     Reviewed and updated as needed this visit by Provider  Meds  Problems           Objective    /70 (BP Location: Right arm, Patient Position: Chair, Cuff Size: Adult Regular)   Pulse 74   Temp 98.1  F (36.7  C) (Oral)   Resp 16   Wt 79.2 kg (174 lb 8 oz)   LMP 06/07/2019 (Approximate)   SpO2 99%   BMI 26.53 kg/m    Body mass index is 26.53 kg/m .  Physical Exam   GENERAL: healthy, alert and no distress  SKIN: maculopapular rash on chest, upper thighs and upper arms, small round papular lesions throughout trunk, arms and legs, also small erythematous maculapapular rash on both cheeks    Diagnostic Test Results:  Labs reviewed in Epic        Assessment & Plan     1. Polymorphic light eruption  - suspect PMLE from recent sun exposure, differential drug reaction   - treatment with ointments below reviewed use and risk  - use antihistamines as needed  - considered differential of drug eruption will have patient review with her oncology team as well   - desonide (DESOWEN) 0.05 % external lotion; Apply topically 2 times daily For 1 week only (face use)  Dispense: 59 mL; Refill: 0  - triamcinolone (KENALOG) 0.1 % external ointment; Apply  topically 2 times daily  Dispense: 80 g; Refill: 0       Return in about 2 days (around 7/11/2019) for oncology follow up .    Danita Pearce PA-C  [facility]

## 2019-07-11 ENCOUNTER — TRANSFERRED RECORDS (OUTPATIENT)
Dept: HEALTH INFORMATION MANAGEMENT | Facility: CLINIC | Age: 34
End: 2019-07-11

## 2019-07-18 ENCOUNTER — TRANSFERRED RECORDS (OUTPATIENT)
Dept: HEALTH INFORMATION MANAGEMENT | Facility: CLINIC | Age: 34
End: 2019-07-18

## 2019-07-23 ENCOUNTER — TRANSFERRED RECORDS (OUTPATIENT)
Dept: HEALTH INFORMATION MANAGEMENT | Facility: CLINIC | Age: 34
End: 2019-07-23

## 2019-07-29 ENCOUNTER — TRANSFERRED RECORDS (OUTPATIENT)
Dept: HEALTH INFORMATION MANAGEMENT | Facility: CLINIC | Age: 34
End: 2019-07-29

## 2019-09-05 ENCOUNTER — TRANSFERRED RECORDS (OUTPATIENT)
Dept: HEALTH INFORMATION MANAGEMENT | Facility: CLINIC | Age: 34
End: 2019-09-05

## 2019-09-26 ENCOUNTER — TRANSFERRED RECORDS (OUTPATIENT)
Dept: HEALTH INFORMATION MANAGEMENT | Facility: CLINIC | Age: 34
End: 2019-09-26

## 2019-10-02 ENCOUNTER — OFFICE VISIT (OUTPATIENT)
Dept: FAMILY MEDICINE | Facility: CLINIC | Age: 34
End: 2019-10-02
Payer: COMMERCIAL

## 2019-10-02 VITALS
BODY MASS INDEX: 26.76 KG/M2 | OXYGEN SATURATION: 100 % | DIASTOLIC BLOOD PRESSURE: 76 MMHG | WEIGHT: 176.6 LBS | RESPIRATION RATE: 16 BRPM | HEART RATE: 79 BPM | SYSTOLIC BLOOD PRESSURE: 109 MMHG | HEIGHT: 68 IN | TEMPERATURE: 98.5 F

## 2019-10-02 DIAGNOSIS — C43.59 MALIGNANT MELANOMA OF SKIN OF TRUNK (H): ICD-10-CM

## 2019-10-02 DIAGNOSIS — E06.3 HASHIMOTO'S THYROIDITIS: ICD-10-CM

## 2019-10-02 DIAGNOSIS — L70.0 ACNE VULGARIS: ICD-10-CM

## 2019-10-02 DIAGNOSIS — N20.0 KIDNEY STONE ON LEFT SIDE: Primary | ICD-10-CM

## 2019-10-02 DIAGNOSIS — F32.1 MODERATE MAJOR DEPRESSION (H): ICD-10-CM

## 2019-10-02 PROCEDURE — 99214 OFFICE O/P EST MOD 30 MIN: CPT | Performed by: FAMILY MEDICINE

## 2019-10-02 RX ORDER — SPIRONOLACTONE 50 MG/1
200 TABLET, FILM COATED ORAL DAILY
Qty: 1 TABLET | Refills: 0 | COMMUNITY
Start: 2019-10-02

## 2019-10-02 RX ORDER — LEVOTHYROXINE SODIUM 50 UG/1
100 TABLET ORAL DAILY
Qty: 100 TABLET | Refills: 2 | COMMUNITY
Start: 2019-10-02

## 2019-10-02 ASSESSMENT — MIFFLIN-ST. JEOR: SCORE: 1539.55

## 2019-10-02 NOTE — PATIENT INSTRUCTIONS
1. Kidney stone on left side, 3 mm asymptomatic   CT scan completed at oncology- no avaliable report  We discussed that if it is only 3mm as reported to her on phone by ONCOLOGIST- then its a high chance of expulsion on own with good hydration.  She does not have any renal colic- so for now it seems asymptomatic.    - UA with Microscopic reflex to Culture    2. Moderate major depression (H)  Sertraline 100 mg once daily - no changes needed     3. Malignant melanoma of skin of trunk (H)- March 2018  Under care of Dr Aman Coates

## 2019-10-02 NOTE — PROGRESS NOTES
"Subjective     Omayra Stewart is a 34 year old female who presents to clinic today for the following health issues:    HPI   URINARY TRACT SYMPTOMS      Duration: ***    Description  { SYMPTOMS FEMALE:830051}    Intensity:  {mild,moderate,severe:654126}    Accompanying signs and symptoms:  Fever/chills: { :496488}  Flank pain { :972029}  Nausea and vomiting: { :222944}  Vaginal symptoms: {VAGINAL SYMPTOMS:776556::\"none\"}  Abdominal/Pelvic Pain: { :666522}    History  History of frequent UTI's: { :571698}  History of kidney stones: { :446644}  Sexually Active: { :882075}  Possibility of pregnancy: { :227317::\"No\"}    Precipitating or alleviating factors: {NONE DEFAULTED:663398::\"None\"}    Therapies tried and outcome: {URINARY TREATMENTS FEMALE:798437::\"none\"}   Outcome: ***    {additonal problems for provider to add (Optional):264435}    {HIST REVIEW/ LINKS 2 (Optional):829603}    {Additional problems for the provider to add (optional):889393}  Reviewed and updated as needed this visit by Provider         Review of Systems   {ROS COMP (Optional):122317}      Objective    There were no vitals taken for this visit.  There is no height or weight on file to calculate BMI.  Physical Exam   {Exam List (Optional):867709}    {Diagnostic Test Results (Optional):430921::\"Diagnostic Test Results:\",\"Labs reviewed in Epic\"}        {PROVIDER CHARTING PREFERENCE:781025}      "

## 2019-10-02 NOTE — PROGRESS NOTES
Subjective     Omayra Stewart is a 34 year old female who presents to clinic today for the following health issues:    HPI     Chief Complaint   Patient presents with     Consult     Patient found out last week that she has a Kidney Stone ([facility])   here to discuss CT scan and pain  CT scan- 09/24, routine scan from oncology- no avaliable report  Was told by oncologist that she had left kidney stone measuring 3 mm non obstructing stone, on left lower lobe of the kidney.  She reports no further information offered , so has has many questions & concerns    lifts weight and had left mid upper back strain and thought it was from  Kidney stone. Pain is dull and started  Sunday night- 9/29.  intermittent pain,mild .      History of  Malignant Melanoma -with LN involvement- see [facility]    PROBLEMS TO ADD ON...    BP Readings from Last 3 Encounters:   10/02/19 109/76   07/09/19 100/70   03/08/19 112/80    Wt Readings from Last 3 Encounters:   10/02/19 80.1 kg (176 lb 9.6 oz)   07/09/19 79.2 kg (174 lb 8 oz)   03/08/19 73.5 kg (162 lb 1 oz)                    PROBLEMS TO ADD ON...  Reviewed and updated as needed this visit by Provider  Meds         Review of Systems   ROS COMP: Constitutional, HEENT, cardiovascular, pulmonary, GI, , musculoskeletal, neuro, skin, endocrine and psych systems are negative, except as otherwise noted.      Objective    /76   Pulse 79   Wt 80.1 kg (176 lb 9.6 oz)   BMI 26.85 kg/m    Body mass index is 26.85 kg/m .  Physical Exam   GENERAL: healthy, alert and no distress  NECK: no adenopathy, no asymmetry, masses, or scars and thyroid normal to palpation  RESP: lungs clear to auscultation - no rales, rhonchi or wheezes  CV: regular rate and rhythm, normal S1 S2, no S3 or S4, no murmur, click or rub, no peripheral edema and peripheral pulses strong  ABDOMEN: soft, nontender, no hepatosplenomegaly, no masses and bowel sounds normal  MS:left sided back pain from  Lifting weight  but no flank pains. no gross musculoskeletal defects noted, no edema    Diagnostic Test Results:  Labs reviewed in Epic        Assessment & Plan     1. Kidney stone on left side, 3 mm asymptomatic     We discussed that if it is only 3mm as reported to her on phone by ONCOLOGIST- then its a high chance of expulsion on own with good hydration.  She does not have any renal colic- so for now it seems asymptomatic.    - UA with Microscopic reflex to Culture in future if burning pain.advised stay well hydrated.  She has a follow up appointment with [facility] in 2 weeks- and gets periodic blood test- and she will check with them about renal functions periodically    encouraged follow up if renal colic or concerns     2. Moderate major depression ([facility])  Sertraline 100 mg once daily - no changes needed     3. Malignant melanoma of skin of trunk ([facility])- March 2018  Under care of Dr Aman Coates    She also takkes levothyroxine for thyroid- dose is update- its under care of endo  And for acne, she take spirinilactone 100 mg once daily      Return in about 4 weeks (around 10/30/2019) for concerns,unresolved.    Kristina Freitas MD  [facility]

## 2019-10-17 ENCOUNTER — TRANSFERRED RECORDS (OUTPATIENT)
Dept: HEALTH INFORMATION MANAGEMENT | Facility: CLINIC | Age: 34
End: 2019-10-17

## 2019-11-07 ENCOUNTER — TRANSFERRED RECORDS (OUTPATIENT)
Dept: HEALTH INFORMATION MANAGEMENT | Facility: CLINIC | Age: 34
End: 2019-11-07

## 2019-11-29 ENCOUNTER — TRANSFERRED RECORDS (OUTPATIENT)
Dept: HEALTH INFORMATION MANAGEMENT | Facility: CLINIC | Age: 34
End: 2019-11-29

## 2020-01-09 ENCOUNTER — TRANSFERRED RECORDS (OUTPATIENT)
Dept: HEALTH INFORMATION MANAGEMENT | Facility: CLINIC | Age: 35
End: 2020-01-09

## 2020-02-20 ENCOUNTER — TRANSFERRED RECORDS (OUTPATIENT)
Dept: HEALTH INFORMATION MANAGEMENT | Facility: CLINIC | Age: 35
End: 2020-02-20

## 2020-03-01 ENCOUNTER — HEALTH MAINTENANCE LETTER (OUTPATIENT)
Age: 35
End: 2020-03-01

## 2020-05-29 ENCOUNTER — TRANSFERRED RECORDS (OUTPATIENT)
Dept: HEALTH INFORMATION MANAGEMENT | Facility: CLINIC | Age: 35
End: 2020-05-29

## 2020-06-05 ENCOUNTER — TRANSFERRED RECORDS (OUTPATIENT)
Dept: HEALTH INFORMATION MANAGEMENT | Facility: CLINIC | Age: 35
End: 2020-06-05

## 2020-06-15 ENCOUNTER — TRANSFERRED RECORDS (OUTPATIENT)
Dept: HEALTH INFORMATION MANAGEMENT | Facility: CLINIC | Age: 35
End: 2020-06-15

## 2020-09-29 ENCOUNTER — TRANSFERRED RECORDS (OUTPATIENT)
Dept: HEALTH INFORMATION MANAGEMENT | Facility: CLINIC | Age: 35
End: 2020-09-29

## 2020-12-13 ENCOUNTER — HEALTH MAINTENANCE LETTER (OUTPATIENT)
Age: 35
End: 2020-12-13

## 2021-03-22 ENCOUNTER — TRANSFERRED RECORDS (OUTPATIENT)
Dept: HEALTH INFORMATION MANAGEMENT | Facility: CLINIC | Age: 36
End: 2021-03-22

## 2021-03-24 ENCOUNTER — IMMUNIZATION (OUTPATIENT)
Dept: NURSING | Facility: CLINIC | Age: 36
End: 2021-03-24
Payer: COMMERCIAL

## 2021-03-24 PROCEDURE — 0001A PR COVID VAC PFIZER DIL RECON 30 MCG/0.3 ML IM: CPT

## 2021-03-24 PROCEDURE — 91300 PR COVID VAC PFIZER DIL RECON 30 MCG/0.3 ML IM: CPT

## 2021-04-14 ENCOUNTER — OFFICE VISIT (OUTPATIENT)
Dept: NURSING | Facility: CLINIC | Age: 36
End: 2021-04-14
Attending: INTERNAL MEDICINE
Payer: COMMERCIAL

## 2021-04-14 PROCEDURE — 0002A PR COVID VAC PFIZER DIL RECON 30 MCG/0.3 ML IM: CPT

## 2021-04-14 PROCEDURE — 91300 PR COVID VAC PFIZER DIL RECON 30 MCG/0.3 ML IM: CPT

## 2021-04-17 ENCOUNTER — HEALTH MAINTENANCE LETTER (OUTPATIENT)
Age: 36
End: 2021-04-17

## 2021-09-10 ENCOUNTER — TRANSFERRED RECORDS (OUTPATIENT)
Dept: HEALTH INFORMATION MANAGEMENT | Facility: CLINIC | Age: 36
End: 2021-09-10
Payer: COMMERCIAL

## 2021-10-02 ENCOUNTER — HEALTH MAINTENANCE LETTER (OUTPATIENT)
Age: 36
End: 2021-10-02

## 2021-10-18 ENCOUNTER — TRANSFERRED RECORDS (OUTPATIENT)
Dept: HEALTH INFORMATION MANAGEMENT | Facility: CLINIC | Age: 36
End: 2021-10-18
Payer: COMMERCIAL

## 2022-03-22 ENCOUNTER — TRANSFERRED RECORDS (OUTPATIENT)
Dept: HEALTH INFORMATION MANAGEMENT | Facility: CLINIC | Age: 37
End: 2022-03-22
Payer: COMMERCIAL

## 2022-05-08 ENCOUNTER — HEALTH MAINTENANCE LETTER (OUTPATIENT)
Age: 37
End: 2022-05-08

## 2022-09-22 ENCOUNTER — ANCILLARY PROCEDURE (OUTPATIENT)
Dept: CT IMAGING | Facility: CLINIC | Age: 37
End: 2022-09-22
Attending: INTERNAL MEDICINE
Payer: COMMERCIAL

## 2022-09-22 DIAGNOSIS — C43.59 MALIGNANT MELANOMA OF SKIN OF TRUNK, EXCEPT SCROTUM (H): ICD-10-CM

## 2022-09-22 PROCEDURE — 255N000002 HC RX 255 OP 636: Performed by: INTERNAL MEDICINE

## 2022-09-22 PROCEDURE — 74177 CT ABD & PELVIS W/CONTRAST: CPT

## 2022-09-22 RX ADMIN — IOHEXOL 100 ML: 350 INJECTION, SOLUTION INTRAVENOUS at 07:51

## 2022-09-26 ENCOUNTER — TRANSFERRED RECORDS (OUTPATIENT)
Dept: HEALTH INFORMATION MANAGEMENT | Facility: CLINIC | Age: 37
End: 2022-09-26

## 2022-10-19 ENCOUNTER — TRANSFERRED RECORDS (OUTPATIENT)
Dept: HEALTH INFORMATION MANAGEMENT | Facility: CLINIC | Age: 37
End: 2022-10-19

## 2023-01-14 ENCOUNTER — HEALTH MAINTENANCE LETTER (OUTPATIENT)
Age: 38
End: 2023-01-14

## 2023-03-28 ENCOUNTER — TRANSFERRED RECORDS (OUTPATIENT)
Dept: HEALTH INFORMATION MANAGEMENT | Facility: CLINIC | Age: 38
End: 2023-03-28

## 2023-04-19 LAB — TSH SERPL-ACNC: 0.18 UIU/ML (ref 0.47–4.68)

## 2023-04-24 ENCOUNTER — TRANSFERRED RECORDS (OUTPATIENT)
Dept: HEALTH INFORMATION MANAGEMENT | Facility: CLINIC | Age: 38
End: 2023-04-24

## 2023-05-10 ENCOUNTER — TRANSFERRED RECORDS (OUTPATIENT)
Dept: HEALTH INFORMATION MANAGEMENT | Facility: CLINIC | Age: 38
End: 2023-05-10

## 2023-06-02 ENCOUNTER — HEALTH MAINTENANCE LETTER (OUTPATIENT)
Age: 38
End: 2023-06-02

## 2023-09-22 ENCOUNTER — TRANSFERRED RECORDS (OUTPATIENT)
Dept: HEALTH INFORMATION MANAGEMENT | Facility: CLINIC | Age: 38
End: 2023-09-22
Payer: COMMERCIAL

## 2023-09-22 ENCOUNTER — ANCILLARY PROCEDURE (OUTPATIENT)
Dept: CT IMAGING | Facility: CLINIC | Age: 38
End: 2023-09-22
Attending: INTERNAL MEDICINE
Payer: COMMERCIAL

## 2023-09-22 DIAGNOSIS — C43.9 MALIGNANT MELANOMA OF SKIN (H): ICD-10-CM

## 2023-09-22 PROCEDURE — 250N000009 HC RX 250: Performed by: INTERNAL MEDICINE

## 2023-09-22 PROCEDURE — 250N000011 HC RX IP 250 OP 636: Mod: JZ | Performed by: INTERNAL MEDICINE

## 2023-09-22 PROCEDURE — 74177 CT ABD & PELVIS W/CONTRAST: CPT

## 2023-09-22 RX ORDER — IOPAMIDOL 755 MG/ML
100 INJECTION, SOLUTION INTRAVASCULAR ONCE
Status: COMPLETED | OUTPATIENT
Start: 2023-09-22 | End: 2023-09-22

## 2023-09-22 RX ADMIN — IOPAMIDOL 100 ML: 755 INJECTION, SOLUTION INTRAVENOUS at 08:07

## 2023-09-22 RX ADMIN — SODIUM CHLORIDE 40 ML: 9 INJECTION, SOLUTION INTRAVENOUS at 08:07

## 2023-09-28 ENCOUNTER — TRANSFERRED RECORDS (OUTPATIENT)
Dept: HEALTH INFORMATION MANAGEMENT | Facility: CLINIC | Age: 38
End: 2023-09-28
Payer: COMMERCIAL

## 2024-02-08 ENCOUNTER — TRANSFERRED RECORDS (OUTPATIENT)
Dept: HEALTH INFORMATION MANAGEMENT | Facility: CLINIC | Age: 39
End: 2024-02-08
Payer: COMMERCIAL

## 2024-03-06 ENCOUNTER — TRANSFERRED RECORDS (OUTPATIENT)
Dept: HEALTH INFORMATION MANAGEMENT | Facility: CLINIC | Age: 39
End: 2024-03-06
Payer: COMMERCIAL

## 2024-04-12 ENCOUNTER — TRANSFERRED RECORDS (OUTPATIENT)
Dept: HEALTH INFORMATION MANAGEMENT | Facility: CLINIC | Age: 39
End: 2024-04-12
Payer: COMMERCIAL

## 2024-09-27 ENCOUNTER — TRANSFERRED RECORDS (OUTPATIENT)
Dept: HEALTH INFORMATION MANAGEMENT | Facility: CLINIC | Age: 39
End: 2024-09-27
Payer: COMMERCIAL

## 2024-11-17 ENCOUNTER — HEALTH MAINTENANCE LETTER (OUTPATIENT)
Age: 39
End: 2024-11-17

## 2025-04-18 ENCOUNTER — TRANSFERRED RECORDS (OUTPATIENT)
Dept: HEALTH INFORMATION MANAGEMENT | Facility: CLINIC | Age: 40
End: 2025-04-18
Payer: COMMERCIAL

## (undated) RX ORDER — FENTANYL CITRATE 50 UG/ML
INJECTION, SOLUTION INTRAMUSCULAR; INTRAVENOUS
Status: DISPENSED
Start: 2018-05-24